# Patient Record
Sex: FEMALE | Race: WHITE | NOT HISPANIC OR LATINO | Employment: FULL TIME | URBAN - METROPOLITAN AREA
[De-identification: names, ages, dates, MRNs, and addresses within clinical notes are randomized per-mention and may not be internally consistent; named-entity substitution may affect disease eponyms.]

---

## 2017-08-11 ENCOUNTER — TRANSCRIBE ORDERS (OUTPATIENT)
Dept: LAB | Facility: HOSPITAL | Age: 47
End: 2017-08-11

## 2017-08-11 ENCOUNTER — APPOINTMENT (OUTPATIENT)
Dept: LAB | Facility: HOSPITAL | Age: 47
End: 2017-08-11

## 2017-08-11 DIAGNOSIS — N95.1 MENOPAUSAL SYMPTOMS: Primary | ICD-10-CM

## 2017-08-11 LAB — FSH SERPL-ACNC: 10 MIU/ML

## 2017-08-11 PROCEDURE — 83001 ASSAY OF GONADOTROPIN (FSH): CPT | Performed by: OBSTETRICS & GYNECOLOGY

## 2017-08-11 PROCEDURE — 36415 COLL VENOUS BLD VENIPUNCTURE: CPT | Performed by: OBSTETRICS & GYNECOLOGY

## 2017-11-22 ENCOUNTER — OFFICE VISIT (OUTPATIENT)
Dept: GYNECOLOGIC ONCOLOGY | Facility: CLINIC | Age: 47
End: 2017-11-22

## 2017-11-22 VITALS
BODY MASS INDEX: 28 KG/M2 | SYSTOLIC BLOOD PRESSURE: 132 MMHG | WEIGHT: 158 LBS | OXYGEN SATURATION: 97 % | HEART RATE: 86 BPM | RESPIRATION RATE: 12 BRPM | DIASTOLIC BLOOD PRESSURE: 74 MMHG | HEIGHT: 63 IN | TEMPERATURE: 98.3 F

## 2017-11-22 DIAGNOSIS — Z98.890 S/P LEEP: ICD-10-CM

## 2017-11-22 DIAGNOSIS — N87.1 DYSPLASIA OF CERVIX, HIGH GRADE CIN 2: Primary | ICD-10-CM

## 2017-11-22 DIAGNOSIS — D06.9 ADENOCARCINOMA IN SITU (AIS) OF UTERINE CERVIX: ICD-10-CM

## 2017-11-22 PROCEDURE — 99244 OFF/OP CNSLTJ NEW/EST MOD 40: CPT | Performed by: OBSTETRICS & GYNECOLOGY

## 2017-11-22 NOTE — PROGRESS NOTES
Simi Royal  [id number]  1970      Reason for visit:  Adenocarcinoma in situ, recent LEEP    Consultation:  Patient is being seen at the request of Dr. Rg     History of present illness:  The patient is a 47 y.o. 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 Patient is s/p LEEP on 17 that showed mild to moderate squamous dysplasia with negative margins but also multiple foci of adenocarcinoma in situ with positive margins. Patient reports no pain from procedure but notes intermittent black discharge since the procedure. Denies vaginal bleeding since endometrial ablation on 2014. Denies weight change, change in appetite, change in girth, pain with defecation or urination.    OBGYN History:  She is a .  She is s/p endometrial ablation  and has not had menstrual bleeding since this time. She has not yet undergone menopause. She does has a history of abnormal pap smears.      Oncologic History:   No history exists.         History reviewed. No pertinent past medical history.    Past Surgical History:   Procedure Laterality Date   • APPENDECTOMY     • CHOLECYSTECTOMY     • ENDOMETRIAL ABLATION         MEDICATIONS: The current medication list was reviewed with the patient and updated in the EMR this date per the Medical Assistant. Medication dosages and frequencies were confirmed to be accurate.      Allergies:  has No Known Allergies.    Social History:   Social History     Social History   • Marital status:      Spouse name: N/A   • Number of children: 3   • Years of education: N/A     Occupational History   • Not on file.     Social History Main Topics   • Smoking status: Never Smoker   • Smokeless tobacco: Not on file   • Alcohol use Yes   • Drug use: No   • Sexual activity: No     Other Topics Concern   • Not on file     Social History Narrative   • No narrative on file       Family History:  No family  "history on file.    Health Maintenance:    Health Maintenance   Topic Date Due   • TDAP/TD VACCINES (1 - Tdap) 01/19/1989   • INFLUENZA VACCINE  08/01/2017   • PAP SMEAR  11/22/2017       Review of Systems   Constitutional: Negative for activity change, appetite change, chills, fatigue, fever and unexpected weight change.   HENT: Negative for dental problem, mouth sores, nosebleeds, postnasal drip, sore throat and trouble swallowing.    Eyes: Negative for pain and visual disturbance.   Respiratory: Negative for cough, chest tightness, shortness of breath and wheezing.    Cardiovascular: Negative for chest pain and leg swelling.   Gastrointestinal: Negative for abdominal pain, blood in stool, constipation, diarrhea, nausea and vomiting.   Endocrine: Negative for cold intolerance, heat intolerance and polyuria.   Genitourinary: Positive for vaginal discharge (intermittent black-colored discharge since LEEP). Negative for dyspareunia, dysuria, enuresis, frequency, hematuria, pelvic pain, urgency and vaginal bleeding.   Musculoskeletal: Negative for arthralgias and gait problem.   Skin: Negative for rash and wound.   Allergic/Immunologic: Negative for environmental allergies, food allergies and immunocompromised state.   Neurological: Negative for dizziness, seizures, weakness, numbness and headaches.   Hematological: Negative for adenopathy. Does not bruise/bleed easily.   Psychiatric/Behavioral: Positive for dysphoric mood. Negative for confusion and sleep disturbance. The patient is nervous/anxious.        Physical Exam    Vitals:    11/22/17 1348   BP: 132/74   Pulse: 86   Resp: 12   Temp: 98.3 °F (36.8 °C)   TempSrc: Temporal Artery    SpO2: 97%   Weight: 158 lb (71.7 kg)   Height: 62.5\" (158.8 cm)     Body mass index is 28.44 kg/(m^2).      GENERAL: Alert, well-appearing female appearing her stated age who is in no apparent distress.   HEENT: Sclera anicteric. Head normocephalic, atraumatic. Mucus membranes moist. "   NECK: Trachea midline, supple, without masses.  No thyromegaly.   BREASTS: Deferred  CARDIOVASCULAR: Normal rate, regular rhythm, no murmurs, rubs, or gallops.  No peripheral edema.  RESPIRATORY: Clear to auscultation bilaterally, normal respiratory effort  BACK:  No CVA tenderness, no vertebral tenderness on palpation  GASTROINTESTINAL:  Abdomen is soft, non-tender, non-distended, no rebound or guarding, no masses, or hernias. No HSM.   SKIN:  Warm, dry, well-perfused.  All visible areas intact.  No rashes, lesions, ulcers.  PSYCHIATRIC: AO x3, with appropriate affect, normal thought processes.  NEUROLOGIC: No focal deficits.  Moves extremities well.  MUSCULOSKELETAL: Normal gait and station.   EXTREMITIES:   No cyanosis, clubbing, symmetric.  LYMPHATICS:  No cervical or inguinal adenopathy noted.     PELVIC exam:    GYNECOLOGIC:  External genitalia are free from lesion. On speculum examination, the cervix appears to be healing well, showing typical postoperative changes, no bleeding. On bimanual examination no mass was appreciated.  Uterus was normal in size and shape. There is no cervical motion or uterine tenderness. No cervical mass was palpated. Parametria were smooth. Rectovaginal exam was deferred.     ECOG PS 0    PROCEDURES:  none    Diagnostic Data:      LEEP pathology 11/13/17:  Residual foci of mild and moderate squamous dysplasia with HV changes (LSIL and HSIL).  The endo and ectocervical margins of excision are free of both squamous lesions.  There are separate foci of endocervical adenocarcinoma in situ.  No invasive neoplasia is identified.  The ectocervical margins of excision of excision is free of the glandular lesion; however, the glandular lesion extends to involve the endocervical margin of excision focally.    HPV 18: positive        Assessment/Plan   This is a 47 y.o. woman who presents today for evaluation and treatment planning for adenocarcinoma on situ on recent LEEP  pathology.    Adenocarcinoma in situ  - multiple foci with positive margins on LEEP  - discussed necessity to proceed towards cold knife cone prior to hysterectomy planning to assess extent of adenocarcinoma in situ and to exclude invasive disease    Patient was consented for cold knife cone in the outpatient surgery center.      Risks and benefits of surgery were discussed.  This included, but was not limited to, infection and bleeding like when the skin is cut; damage to surrounding structures; and incisional complications.  Typical hospital stay and recovery were discussed as well as post-procedure precautions.  Surgical implications of chronic illnesses on recovery and surgical outcome were reviewed.     Patient verbalized understanding of the plan including the risks and benefits.  Appropriate perioperative testing including laboratory evaluation, EKG as clinically indicated, chest x-ray as clinically indicated, and preadmission evaluation were all ordered as a part of this patient's care.    FOLLOW UP: Follow up for surgery    Note: Speech recognition transcription software was used to dictate portions of this document.  An attempt at proofreading has been made though minor errors in transcription may still be present.  Please do not hesitate to call our office with any questions.    Patient was seen and examined with Dr. Whittington,  resident, who performed portions of the examination and documentation for this patient's care under my direct supervision.    Kellen David MD  11/22/17  5:06 PM

## 2017-11-27 ENCOUNTER — PREP FOR SURGERY (OUTPATIENT)
Dept: GYNECOLOGIC ONCOLOGY | Facility: CLINIC | Age: 47
End: 2017-11-27

## 2017-11-27 DIAGNOSIS — D06.9 ADENOCARCINOMA IN SITU (AIS) OF UTERINE CERVIX: Primary | ICD-10-CM

## 2017-11-28 ENCOUNTER — PREP FOR SURGERY (OUTPATIENT)
Dept: GYNECOLOGIC ONCOLOGY | Facility: CLINIC | Age: 47
End: 2017-11-28

## 2017-11-28 NOTE — PATIENT INSTRUCTIONS
Outpatient Pre-op Patient Education  *See checked boxes for your instructions*    Simi Royal  [id number]  1970    SURGEON: Dr. David    Appointment  [x]  1. Your surgery has been scheduled on 12-18-17 at [facility] located at [address]. You will need to be there at 8:45 AM.   [x] 2.  You will need to have your blood work done on 12-13-17 at [facility] in [place].  You may go anytime that day, and do not need to be fasting.    [] 3.  The registration department is located in the long hallway between the [address].       The Day(s) Before Surgery  [x] 1.  Do not drink alcohol or smoke.     [x] 2.  Do not take vitamins or aspirin one week before surgery.       [x] 3.  If you are ill on the days leading up to your surgery, please call our office.      [x] 4.  If you are using medications for diabetes, call the physician who manages these and get instructions on how they should be taken before and after surgery.    [x] 5.  Nothing to eat or drink after midnight on 12-17-17.      [x] 6.  Please make prior arrangements for someone to drive you home after your procedure        The Day of Surgery  [x] 1.  Do not eat, drink, or chew gum.     [x] 2.  On the morning of your surgery, you may take her prescription medications with a sip of water. Bring all medication with you to the surgery center. (Diabetic patients should bring insulin if instructed to do so by their diabetes managing physician).   [x] 3. Bathe or shower the morning of your surgery. Do not use powders, lotions, or creams. Deodorants are okay.     [x] 4. Wear loose, comfortable clothing.     [x] 5. Bring holders for glasses, contacts, or dentures.      [x] 6. Bring any required payment and forms, including insurance cards. Leave all money and valuables at home.        Post-surgery Instructions  [x] 1.  For the first 24 hours, rest and take periodic deep breaths to remove anesthetic agents from  your body.   [x] 2.  Follow any specific instructions relevant to your particular surgery.     [x] 3.  Limit activity to avoid stress to the surgical site.      [x] 4.  Keep all dressings dry. Shower or bathe as instructed by your doctor.     [x] 5.  Drink and eat light foods. Remain on liquids alone only if nausea and vomiting occur. Return to your regular diet gradually, as tolerance allows.    [x] 6. Avoid alcohol for at least 24 hours.      [x] 7.  Take prescription pain medication as directed and with food.      [x] 8.  Call our office after discharge from the surgery center to make your post-op appointment.        In Case of Emergency:  Call our office if you experience any of the following:  - Excessive drainage, bleeding, swelling, or redness at the incision site  - Severe pain not eased by pain medication  - Temperature above 101  - Persistent nausea or vomiting  - Skin rash or general body itching

## 2017-12-15 ENCOUNTER — LAB (OUTPATIENT)
Dept: LAB | Facility: HOSPITAL | Age: 47
End: 2017-12-15

## 2017-12-15 DIAGNOSIS — N87.1 DYSPLASIA OF CERVIX, HIGH GRADE CIN 2: ICD-10-CM

## 2017-12-15 DIAGNOSIS — D06.9 ADENOCARCINOMA IN SITU (AIS) OF UTERINE CERVIX: ICD-10-CM

## 2017-12-15 DIAGNOSIS — Z98.890 S/P LEEP: Primary | ICD-10-CM

## 2017-12-15 DIAGNOSIS — Z98.890 S/P LEEP: ICD-10-CM

## 2017-12-15 LAB
ALBUMIN SERPL-MCNC: 4.5 G/DL (ref 3.5–5)
ALBUMIN/GLOB SERPL: 1.7 G/DL (ref 1.5–2.5)
ALP SERPL-CCNC: 62 U/L (ref 35–104)
ALT SERPL W P-5'-P-CCNC: 22 U/L (ref 10–36)
ANION GAP SERPL CALCULATED.3IONS-SCNC: 6 MMOL/L (ref 3.6–11.2)
AST SERPL-CCNC: 20 U/L (ref 10–30)
BASOPHILS # BLD AUTO: 0.12 10*3/MM3 (ref 0–0.3)
BASOPHILS NFR BLD AUTO: 2.8 % (ref 0–2)
BILIRUB SERPL-MCNC: 0.7 MG/DL (ref 0.2–1.8)
BUN BLD-MCNC: 10 MG/DL (ref 7–21)
BUN/CREAT SERPL: 10.9 (ref 7–25)
CALCIUM SPEC-SCNC: 9.6 MG/DL (ref 7.7–10)
CHLORIDE SERPL-SCNC: 107 MMOL/L (ref 99–112)
CO2 SERPL-SCNC: 27 MMOL/L (ref 24.3–31.9)
CREAT BLD-MCNC: 0.92 MG/DL (ref 0.43–1.29)
DEPRECATED RDW RBC AUTO: 40.5 FL (ref 37–54)
EOSINOPHIL # BLD AUTO: 0.29 10*3/MM3 (ref 0–0.7)
EOSINOPHIL NFR BLD AUTO: 6.9 % (ref 0–5)
ERYTHROCYTE [DISTWIDTH] IN BLOOD BY AUTOMATED COUNT: 12.3 % (ref 11.5–14.5)
GFR SERPL CREATININE-BSD FRML MDRD: 65 ML/MIN/1.73
GLOBULIN UR ELPH-MCNC: 2.6 GM/DL
GLUCOSE BLD-MCNC: 92 MG/DL (ref 70–110)
HCG INTACT+B SERPL-ACNC: <2 MIU/ML (ref 0–5)
HCT VFR BLD AUTO: 40.8 % (ref 37–47)
HGB BLD-MCNC: 13.6 G/DL (ref 12–16)
IMM GRANULOCYTES # BLD: 0.01 10*3/MM3 (ref 0–0.03)
IMM GRANULOCYTES NFR BLD: 0.2 % (ref 0–0.5)
LYMPHOCYTES # BLD AUTO: 1.74 10*3/MM3 (ref 1–3)
LYMPHOCYTES NFR BLD AUTO: 41.2 % (ref 21–51)
MCH RBC QN AUTO: 30.8 PG (ref 27–33)
MCHC RBC AUTO-ENTMCNC: 33.3 G/DL (ref 33–37)
MCV RBC AUTO: 92.5 FL (ref 80–94)
MONOCYTES # BLD AUTO: 0.42 10*3/MM3 (ref 0.1–0.9)
MONOCYTES NFR BLD AUTO: 10 % (ref 0–10)
NEUTROPHILS # BLD AUTO: 1.64 10*3/MM3 (ref 1.4–6.5)
NEUTROPHILS NFR BLD AUTO: 38.9 % (ref 30–70)
OSMOLALITY SERPL CALC.SUM OF ELEC: 278.1 MOSM/KG (ref 273–305)
PLATELET # BLD AUTO: 271 10*3/MM3 (ref 130–400)
PMV BLD AUTO: 9.6 FL (ref 6–10)
POTASSIUM BLD-SCNC: 3.8 MMOL/L (ref 3.5–5.3)
PROT SERPL-MCNC: 7.1 G/DL (ref 6–8)
RBC # BLD AUTO: 4.41 10*6/MM3 (ref 4.2–5.4)
SODIUM BLD-SCNC: 140 MMOL/L (ref 135–153)
WBC NRBC COR # BLD: 4.22 10*3/MM3 (ref 4.5–12.5)

## 2017-12-15 PROCEDURE — 84702 CHORIONIC GONADOTROPIN TEST: CPT

## 2017-12-15 PROCEDURE — 36415 COLL VENOUS BLD VENIPUNCTURE: CPT

## 2017-12-15 PROCEDURE — 85025 COMPLETE CBC W/AUTO DIFF WBC: CPT

## 2017-12-15 PROCEDURE — 80053 COMPREHEN METABOLIC PANEL: CPT

## 2017-12-18 ENCOUNTER — OUTSIDE FACILITY SERVICE (OUTPATIENT)
Dept: GYNECOLOGIC ONCOLOGY | Facility: CLINIC | Age: 47
End: 2017-12-18

## 2017-12-18 ENCOUNTER — LAB REQUISITION (OUTPATIENT)
Dept: LAB | Facility: HOSPITAL | Age: 47
End: 2017-12-18

## 2017-12-18 DIAGNOSIS — D06.9 CARCINOMA IN SITU OF CERVIX: ICD-10-CM

## 2017-12-18 PROCEDURE — 88307 TISSUE EXAM BY PATHOLOGIST: CPT | Performed by: OBSTETRICS & GYNECOLOGY

## 2017-12-18 PROCEDURE — 57520 CONIZATION OF CERVIX: CPT | Performed by: OBSTETRICS & GYNECOLOGY

## 2017-12-20 LAB
CYTO UR: NORMAL
LAB AP CASE REPORT: NORMAL
LAB AP CLINICAL INFORMATION: NORMAL
LAB AP DIAGNOSIS COMMENT: NORMAL
Lab: NORMAL
PATH REPORT.FINAL DX SPEC: NORMAL
PATH REPORT.GROSS SPEC: NORMAL

## 2017-12-22 ENCOUNTER — TELEPHONE (OUTPATIENT)
Dept: GYNECOLOGIC ONCOLOGY | Facility: CLINIC | Age: 47
End: 2017-12-22

## 2017-12-22 ENCOUNTER — PREP FOR SURGERY (OUTPATIENT)
Dept: GYNECOLOGIC ONCOLOGY | Facility: CLINIC | Age: 47
End: 2017-12-22

## 2017-12-22 DIAGNOSIS — D06.9 ADENOCARCINOMA IN SITU OF CERVIX: Primary | ICD-10-CM

## 2017-12-22 NOTE — TELEPHONE ENCOUNTER
MD Elana Rose, CHRISTIE                     Please notify patient of pathology:  No further AIS noted.   Patient can be scheduled for LAVH +/- BSO 4-6 weeks after conization.  I will see in office in 4 weeks for post op check.  Thanks      Phoned pt per Dr. David's v/o, informed her of results, date for surgery set for 1-17-18, informed pt of surgery to be done, states wants everything removed.  Told her to let Dr. David aware at time of post op appt.  Pt v/u, will do as instructed, transferred to reception to schedule.

## 2017-12-27 RX ORDER — PREGABALIN 25 MG/1
150 CAPSULE ORAL ONCE
Status: CANCELLED | OUTPATIENT
Start: 2017-12-27 | End: 2017-12-27

## 2017-12-27 RX ORDER — SODIUM CHLORIDE, SODIUM LACTATE, POTASSIUM CHLORIDE, CALCIUM CHLORIDE 600; 310; 30; 20 MG/100ML; MG/100ML; MG/100ML; MG/100ML
100 INJECTION, SOLUTION INTRAVENOUS CONTINUOUS
Status: CANCELLED | OUTPATIENT
Start: 2017-12-27

## 2017-12-27 RX ORDER — CELECOXIB 100 MG/1
200 CAPSULE ORAL ONCE
Status: CANCELLED | OUTPATIENT
Start: 2017-12-27 | End: 2017-12-27

## 2017-12-27 RX ORDER — ACETAMINOPHEN 325 MG/1
650 TABLET ORAL ONCE
Status: CANCELLED | OUTPATIENT
Start: 2017-12-27 | End: 2017-12-27

## 2017-12-28 ENCOUNTER — PATIENT EDUCATION (SURGERY INSTRUCTIONS) (OUTPATIENT)
Dept: GYNECOLOGIC ONCOLOGY | Facility: CLINIC | Age: 47
End: 2017-12-28

## 2017-12-28 PROBLEM — D06.9 ADENOCARCINOMA IN SITU OF CERVIX: Status: ACTIVE | Noted: 2017-12-28

## 2017-12-28 NOTE — PATIENT INSTRUCTIONS
Gynecologic Oncology  Inpatient Pre-op Patient Education  *See checked boxes for your instructions*    Patient Name:  Simi Royal  [id number]  1970    Surgeon:  Dr. David    Appointment  [x]  1. Your surgery has been scheduled on 1-17-18. You will need to be at the second floor surgery registration of the [facility] hospital on that day at 6:00 AM.   [x] 2.  You have a pre-admission testing (PAT) appointment for labs and possibly chest xray and EKG, on 1-16-18 at 2:00 PM.  You will need to be at hospital registration on the first floor, 10 minutes before that time.   [x] 3.  The hospital registration department is located in the long hallway between the [address] and [address] buildings.     The Day(s) Before Surgery  [x] 1. On 1-16-18, the day prior to surgery, eat lightly.  No solid food after midnight on 1-16-18, including NO MILK, CREAM, OR ORANGE JUICE.  You may have sips of clear fluids up until two-three hours prior to your arrival to the hospital on the morning of surgery.     [] 2.  You may need to use a stool softener, the day prior to surgery to help with existing constipation and to clean out your bowels.  You can purchase Miralax over-the-counter at the pharmacy and follow the directions on the back.  (Do not do this step unless the box is checked).   [x] 3.  Do not take vitamins or full dose aspirin one week before surgery.  If you normally take a blood thinning medication such as Warfarin, Eliquis, or Xarelto, we will give you specific instructions regarding these medications and we may need to talk with your other doctors.   [x] 4.  On the morning of your surgery, you may likely take your routine prescription medications with a sip of water as reviewed with you by your surgeon.  Bring your home medications with you to the hospital as we may need to reference these.  In particular be sure to bring any inhalers.     Post-surgery Instructions  [x] 1.  The length of stay for your type of surgery is  typically one hospital night, however it is also possible that you could be discharged home in the evening on the same day depending on the nature of your surgery.  All rooms are private, so family member may stay with you.     [x] 2.  Do not take your own home prescription medication while you are in the hospital unless otherwise instructed.  These will be provided to you.         Comments:

## 2018-01-11 ENCOUNTER — OFFICE VISIT (OUTPATIENT)
Dept: GYNECOLOGIC ONCOLOGY | Facility: CLINIC | Age: 48
End: 2018-01-11

## 2018-01-11 VITALS
HEART RATE: 74 BPM | SYSTOLIC BLOOD PRESSURE: 126 MMHG | TEMPERATURE: 98 F | WEIGHT: 163 LBS | OXYGEN SATURATION: 98 % | BODY MASS INDEX: 29.34 KG/M2 | DIASTOLIC BLOOD PRESSURE: 73 MMHG | RESPIRATION RATE: 14 BRPM

## 2018-01-11 DIAGNOSIS — D06.9 ADENOCARCINOMA IN SITU (AIS) OF UTERINE CERVIX: Primary | ICD-10-CM

## 2018-01-11 DIAGNOSIS — Z98.890 POST-OPERATIVE STATE: ICD-10-CM

## 2018-01-11 PROCEDURE — 99024 POSTOP FOLLOW-UP VISIT: CPT | Performed by: OBSTETRICS & GYNECOLOGY

## 2018-01-11 NOTE — PROGRESS NOTES
Simi Royal  [id number]  1970      Reason for Visit:  Postoperative evaluation    History of Present Illness:  Patient is a very pleasant 47 y.o. woman who presents for a post operative evaluation status post Cold Knife Cone performed on 12/18/17 for AIS diagnosed on LEEP tonya .  Surgery was uncomplicated.  Today, patient notes normal bowel and bladder function.  Her pain is well controlled. She has questions about resuming normal activities.     Past Medical History, Past Surgical History, Social History, Family History have been reviewed and are without significant changes except as mentioned.    Review of Systems   A comprehensive 12 point review of systems was performed and was negative except as mentioned.    Medications:  The current medication list was reviewed in the EMR    ALLERGIES:  No Known Allergies        /73  Pulse 74  Temp 98 °F (36.7 °C) (Temporal Artery )   Resp 14  Wt 73.9 kg (163 lb)  SpO2 98%  BMI 29.34 kg/m2       Physical Exam  Constitutional:  Patient is a pleasant woman in no acute distress.  Gastrointestinal: Abdomen is soft and non tender.  There is no mass palpated.  There is no rebound or guarding.   Extremities:  Bilateral lower extremities are non-tender.  Gynecologic:GYNECOLOGIC:  External genitalia are free from lesion. On speculum examination, the cervix was healing well from CKC. On bimanual examination no mass was appreciated.  Uterus was normal in size and shape. There is no cervical motion or uterine tenderness. No cervical mass was palpated. Parametria were smooth. Rectovaginal exam was deferred.       Pathology:    Final Diagnosis   1. CERVICAL CONE, EXCISION:   Ectocervical tissue with focal acute and chronic cervicitis and reactive changes.   Inflammation and reactive changes compatible with prior surgical procedure with limited transformation zone present with reactive appearing endocervical tissue present.  Changes consistent with prior  "surgical procedure extend to the endocervical margins of this specimen (See comment)  2. CERVICAL \"ADDITIONAL CERVICAL CONE\":  Benign endocervical tissue.   Acute and chronic inflammation.   No dysplasia identified. (See comment)         ASSESSMENT/PLAN:  Simi Royal returns for a post-operative evaluation today s/p Cold Knife Cone for AIS.  All pathology reports were given to patient.      Cone margins negative for malignancy. Cervix healing well post procedure. Patient already scheduled for LAVH/BSO on 1/17/18.    Risks and benefits of surgery were discussed.  This included, but was not limited to, infection and bleeding like when the skin is cut; damage to surrounding structures; and incisional complications.  Risk of DVT was addressed for major surgeries.  Standard of care efforts to minimize these risks were reviewed.  Typical hospital stay and recovery were discussed as well as post-procedure precautions.  Surgical implications of chronic illnesses on recovery and surgical outcome were reviewed.     Discussed risks of removal of ovaries including surgical menopause, cardiovascular and bone effects. Also discussed risks with retention of ovaries including risk of needing additional surgery, overall lifetime risks of ovarian cancer. Discussed average age of menopause being 51. Patient desires bilateral salpingectomy at time of hysterectomy.     Patient verbalized understanding of the plan including the risks and benefits.  Appropriate perioperative testing including laboratory evaluation, EKG as clinically indicated, chest x-ray as clinically indicated, and preadmission evaluation were all ordered as a part of this patient's care.     Overall, the patient is very pleased with her care.  I recommended continuation of post operative precautions as discussed.     She is to present for scheduled Laparoscopic Assisted Vaginal Hysterectomy, bilateral salpingectomy on 1/17/18.     Chyna Braden, " MD      Patient was seen and examined with Dr. Braden,  resident, who performed portions of the examination and documentation for this patient's care under my direct supervision.    Kellen David MD  01/13/18  10:04 AM

## 2018-01-16 ENCOUNTER — PREP FOR SURGERY (OUTPATIENT)
Dept: GYNECOLOGIC ONCOLOGY | Facility: CLINIC | Age: 48
End: 2018-01-16

## 2018-01-16 ENCOUNTER — APPOINTMENT (OUTPATIENT)
Dept: PREADMISSION TESTING | Facility: HOSPITAL | Age: 48
End: 2018-01-16

## 2018-01-16 VITALS — WEIGHT: 163 LBS | BODY MASS INDEX: 28.88 KG/M2 | HEIGHT: 63 IN

## 2018-01-16 DIAGNOSIS — D06.9 ADENOCARCINOMA IN SITU (AIS) OF UTERINE CERVIX: Primary | ICD-10-CM

## 2018-01-16 DIAGNOSIS — D06.9 ADENOCARCINOMA IN SITU OF CERVIX: ICD-10-CM

## 2018-01-16 LAB
ABO GROUP BLD: NORMAL
ALBUMIN SERPL-MCNC: 4.4 G/DL (ref 3.2–4.8)
ALBUMIN/GLOB SERPL: 1.6 G/DL (ref 1.5–2.5)
ALP SERPL-CCNC: 65 U/L (ref 25–100)
ALT SERPL W P-5'-P-CCNC: 23 U/L (ref 7–40)
ANION GAP SERPL CALCULATED.3IONS-SCNC: 8 MMOL/L (ref 3–11)
AST SERPL-CCNC: 18 U/L (ref 0–33)
BASOPHILS # BLD AUTO: 0.07 10*3/MM3 (ref 0–0.2)
BASOPHILS NFR BLD AUTO: 1.3 % (ref 0–1)
BILIRUB SERPL-MCNC: 0.6 MG/DL (ref 0.3–1.2)
BLD GP AB SCN SERPL QL: NEGATIVE
BUN BLD-MCNC: 9 MG/DL (ref 9–23)
BUN/CREAT SERPL: 11.3 (ref 7–25)
CALCIUM SPEC-SCNC: 9.5 MG/DL (ref 8.7–10.4)
CHLORIDE SERPL-SCNC: 103 MMOL/L (ref 99–109)
CO2 SERPL-SCNC: 28 MMOL/L (ref 20–31)
CREAT BLD-MCNC: 0.8 MG/DL (ref 0.6–1.3)
DEPRECATED RDW RBC AUTO: 41.8 FL (ref 37–54)
EOSINOPHIL # BLD AUTO: 0.42 10*3/MM3 (ref 0–0.3)
EOSINOPHIL NFR BLD AUTO: 8 % (ref 0–3)
ERYTHROCYTE [DISTWIDTH] IN BLOOD BY AUTOMATED COUNT: 12.3 % (ref 11.3–14.5)
GFR SERPL CREATININE-BSD FRML MDRD: 77 ML/MIN/1.73
GLOBULIN UR ELPH-MCNC: 2.8 GM/DL
GLUCOSE BLD-MCNC: 82 MG/DL (ref 70–100)
HCG INTACT+B SERPL-ACNC: <5 MIU/ML
HCT VFR BLD AUTO: 42.1 % (ref 34.5–44)
HGB BLD-MCNC: 14.1 G/DL (ref 11.5–15.5)
IMM GRANULOCYTES # BLD: 0.01 10*3/MM3 (ref 0–0.03)
IMM GRANULOCYTES NFR BLD: 0.2 % (ref 0–0.6)
LYMPHOCYTES # BLD AUTO: 2.25 10*3/MM3 (ref 0.6–4.8)
LYMPHOCYTES NFR BLD AUTO: 42.9 % (ref 24–44)
MCH RBC QN AUTO: 30.9 PG (ref 27–31)
MCHC RBC AUTO-ENTMCNC: 33.5 G/DL (ref 32–36)
MCV RBC AUTO: 92.3 FL (ref 80–99)
MONOCYTES # BLD AUTO: 0.38 10*3/MM3 (ref 0–1)
MONOCYTES NFR BLD AUTO: 7.2 % (ref 0–12)
NEUTROPHILS # BLD AUTO: 2.12 10*3/MM3 (ref 1.5–8.3)
NEUTROPHILS NFR BLD AUTO: 40.4 % (ref 41–71)
PLATELET # BLD AUTO: 244 10*3/MM3 (ref 150–450)
PMV BLD AUTO: 9 FL (ref 6–12)
POTASSIUM BLD-SCNC: 3.5 MMOL/L (ref 3.5–5.5)
PROT SERPL-MCNC: 7.2 G/DL (ref 5.7–8.2)
RBC # BLD AUTO: 4.56 10*6/MM3 (ref 3.89–5.14)
RH BLD: POSITIVE
SODIUM BLD-SCNC: 139 MMOL/L (ref 132–146)
WBC NRBC COR # BLD: 5.25 10*3/MM3 (ref 3.5–10.8)

## 2018-01-16 PROCEDURE — 80053 COMPREHEN METABOLIC PANEL: CPT | Performed by: OBSTETRICS & GYNECOLOGY

## 2018-01-16 PROCEDURE — 85025 COMPLETE CBC W/AUTO DIFF WBC: CPT | Performed by: OBSTETRICS & GYNECOLOGY

## 2018-01-16 PROCEDURE — 84702 CHORIONIC GONADOTROPIN TEST: CPT | Performed by: OBSTETRICS & GYNECOLOGY

## 2018-01-16 PROCEDURE — 36415 COLL VENOUS BLD VENIPUNCTURE: CPT

## 2018-01-16 PROCEDURE — 86901 BLOOD TYPING SEROLOGIC RH(D): CPT | Performed by: OBSTETRICS & GYNECOLOGY

## 2018-01-16 PROCEDURE — 86850 RBC ANTIBODY SCREEN: CPT | Performed by: OBSTETRICS & GYNECOLOGY

## 2018-01-16 PROCEDURE — 86900 BLOOD TYPING SEROLOGIC ABO: CPT | Performed by: OBSTETRICS & GYNECOLOGY

## 2018-01-17 ENCOUNTER — ANESTHESIA (OUTPATIENT)
Dept: PERIOP | Facility: HOSPITAL | Age: 48
End: 2018-01-17

## 2018-01-17 ENCOUNTER — ANESTHESIA EVENT (OUTPATIENT)
Dept: PERIOP | Facility: HOSPITAL | Age: 48
End: 2018-01-17

## 2018-01-17 ENCOUNTER — HOSPITAL ENCOUNTER (OUTPATIENT)
Facility: HOSPITAL | Age: 48
Discharge: HOME | End: 2018-01-17
Attending: OBSTETRICS & GYNECOLOGY | Admitting: OBSTETRICS & GYNECOLOGY

## 2018-01-17 VITALS
OXYGEN SATURATION: 98 % | HEART RATE: 93 BPM | TEMPERATURE: 97.9 F | SYSTOLIC BLOOD PRESSURE: 117 MMHG | RESPIRATION RATE: 18 BRPM | DIASTOLIC BLOOD PRESSURE: 72 MMHG

## 2018-01-17 DIAGNOSIS — D06.9 ADENOCARCINOMA IN SITU OF CERVIX: ICD-10-CM

## 2018-01-17 PROCEDURE — 25010000002 ONDANSETRON PER 1 MG: Performed by: NURSE ANESTHETIST, CERTIFIED REGISTERED

## 2018-01-17 PROCEDURE — 25010000002 PROMETHAZINE PER 50 MG: Performed by: ANESTHESIOLOGY

## 2018-01-17 PROCEDURE — G0378 HOSPITAL OBSERVATION PER HR: HCPCS

## 2018-01-17 PROCEDURE — 25010000002 PROPOFOL 10 MG/ML EMULSION: Performed by: NURSE ANESTHETIST, CERTIFIED REGISTERED

## 2018-01-17 PROCEDURE — 25010000002 FENTANYL CITRATE (PF) 100 MCG/2ML SOLUTION: Performed by: NURSE ANESTHETIST, CERTIFIED REGISTERED

## 2018-01-17 PROCEDURE — 88309 TISSUE EXAM BY PATHOLOGIST: CPT | Performed by: OBSTETRICS & GYNECOLOGY

## 2018-01-17 PROCEDURE — 25010000002 DEXAMETHASONE PER 1 MG: Performed by: NURSE ANESTHETIST, CERTIFIED REGISTERED

## 2018-01-17 PROCEDURE — 58552 LAPARO-VAG HYST INCL T/O: CPT | Performed by: OBSTETRICS & GYNECOLOGY

## 2018-01-17 PROCEDURE — 25010000002 CEFOXITIN PER 1 G: Performed by: OBSTETRICS & GYNECOLOGY

## 2018-01-17 PROCEDURE — 25010000002 NEOSTIGMINE 10 MG/10ML SOLUTION: Performed by: NURSE ANESTHETIST, CERTIFIED REGISTERED

## 2018-01-17 RX ORDER — BUPIVACAINE HYDROCHLORIDE AND EPINEPHRINE 5; 5 MG/ML; UG/ML
INJECTION, SOLUTION PERINEURAL AS NEEDED
Status: DISCONTINUED | OUTPATIENT
Start: 2018-01-17 | End: 2018-01-17 | Stop reason: HOSPADM

## 2018-01-17 RX ORDER — MAGNESIUM HYDROXIDE 1200 MG/15ML
LIQUID ORAL AS NEEDED
Status: DISCONTINUED | OUTPATIENT
Start: 2018-01-17 | End: 2018-01-17 | Stop reason: HOSPADM

## 2018-01-17 RX ORDER — FAMOTIDINE 10 MG/ML
20 INJECTION, SOLUTION INTRAVENOUS ONCE
Status: CANCELLED | OUTPATIENT
Start: 2018-01-17 | End: 2018-01-17

## 2018-01-17 RX ORDER — OXYCODONE HYDROCHLORIDE 5 MG/1
10 TABLET ORAL EVERY 4 HOURS PRN
Status: DISCONTINUED | OUTPATIENT
Start: 2018-01-17 | End: 2018-01-17 | Stop reason: HOSPADM

## 2018-01-17 RX ORDER — HYDROMORPHONE HYDROCHLORIDE 1 MG/ML
0.5 INJECTION, SOLUTION INTRAMUSCULAR; INTRAVENOUS; SUBCUTANEOUS
Status: DISCONTINUED | OUTPATIENT
Start: 2018-01-17 | End: 2018-01-17 | Stop reason: HOSPADM

## 2018-01-17 RX ORDER — SODIUM CHLORIDE, SODIUM LACTATE, POTASSIUM CHLORIDE, CALCIUM CHLORIDE 600; 310; 30; 20 MG/100ML; MG/100ML; MG/100ML; MG/100ML
100 INJECTION, SOLUTION INTRAVENOUS CONTINUOUS
Status: DISCONTINUED | OUTPATIENT
Start: 2018-01-17 | End: 2018-01-17 | Stop reason: HOSPADM

## 2018-01-17 RX ORDER — LABETALOL HYDROCHLORIDE 5 MG/ML
5 INJECTION, SOLUTION INTRAVENOUS
Status: DISCONTINUED | OUTPATIENT
Start: 2018-01-17 | End: 2018-01-17 | Stop reason: HOSPADM

## 2018-01-17 RX ORDER — FENTANYL CITRATE 50 UG/ML
INJECTION, SOLUTION INTRAMUSCULAR; INTRAVENOUS AS NEEDED
Status: DISCONTINUED | OUTPATIENT
Start: 2018-01-17 | End: 2018-01-17 | Stop reason: SURG

## 2018-01-17 RX ORDER — ACETAMINOPHEN 325 MG/1
650 TABLET ORAL EVERY 6 HOURS PRN
Qty: 60 TABLET | Refills: 0 | Status: SHIPPED | OUTPATIENT
Start: 2018-01-17 | End: 2018-02-08

## 2018-01-17 RX ORDER — SODIUM CHLORIDE 0.9 % (FLUSH) 0.9 %
1-10 SYRINGE (ML) INJECTION AS NEEDED
Status: DISCONTINUED | OUTPATIENT
Start: 2018-01-17 | End: 2018-01-17 | Stop reason: HOSPADM

## 2018-01-17 RX ORDER — OXYCODONE HYDROCHLORIDE 5 MG/1
5 TABLET ORAL EVERY 4 HOURS PRN
Qty: 20 TABLET | Refills: 0 | Status: SHIPPED | OUTPATIENT
Start: 2018-01-17 | End: 2018-02-08

## 2018-01-17 RX ORDER — PROMETHAZINE HYDROCHLORIDE 25 MG/ML
6.25 INJECTION, SOLUTION INTRAMUSCULAR; INTRAVENOUS ONCE AS NEEDED
Status: DISCONTINUED | OUTPATIENT
Start: 2018-01-17 | End: 2018-01-17

## 2018-01-17 RX ORDER — POLYETHYLENE GLYCOL 3350 17 G/17G
17 POWDER, FOR SOLUTION ORAL DAILY
Qty: 30 EACH | Refills: 2 | Status: SHIPPED | OUTPATIENT
Start: 2018-01-17 | End: 2018-02-08

## 2018-01-17 RX ORDER — ONDANSETRON 4 MG/1
4 TABLET, FILM COATED ORAL EVERY 8 HOURS PRN
Qty: 10 TABLET | Refills: 2 | Status: SHIPPED | OUTPATIENT
Start: 2018-01-17 | End: 2018-02-08

## 2018-01-17 RX ORDER — BUPIVACAINE HYDROCHLORIDE AND EPINEPHRINE 2.5; 5 MG/ML; UG/ML
INJECTION, SOLUTION INFILTRATION; PERINEURAL AS NEEDED
Status: DISCONTINUED | OUTPATIENT
Start: 2018-01-17 | End: 2018-01-17 | Stop reason: HOSPADM

## 2018-01-17 RX ORDER — IBUPROFEN 600 MG/1
600 TABLET ORAL EVERY 6 HOURS PRN
Status: DISCONTINUED | OUTPATIENT
Start: 2018-01-17 | End: 2018-01-17 | Stop reason: HOSPADM

## 2018-01-17 RX ORDER — OXYCODONE HYDROCHLORIDE 5 MG/1
5 TABLET ORAL EVERY 4 HOURS PRN
Status: DISCONTINUED | OUTPATIENT
Start: 2018-01-17 | End: 2018-01-17 | Stop reason: HOSPADM

## 2018-01-17 RX ORDER — ONDANSETRON 2 MG/ML
INJECTION INTRAMUSCULAR; INTRAVENOUS AS NEEDED
Status: DISCONTINUED | OUTPATIENT
Start: 2018-01-17 | End: 2018-01-17 | Stop reason: SURG

## 2018-01-17 RX ORDER — ATRACURIUM BESYLATE 10 MG/ML
INJECTION, SOLUTION INTRAVENOUS AS NEEDED
Status: DISCONTINUED | OUTPATIENT
Start: 2018-01-17 | End: 2018-01-17 | Stop reason: SURG

## 2018-01-17 RX ORDER — IBUPROFEN 600 MG/1
600 TABLET ORAL EVERY 6 HOURS
Qty: 60 TABLET | Refills: 0 | Status: SHIPPED | OUTPATIENT
Start: 2018-01-17 | End: 2018-02-08

## 2018-01-17 RX ORDER — ACETAMINOPHEN 325 MG/1
650 TABLET ORAL EVERY 6 HOURS
Status: DISCONTINUED | OUTPATIENT
Start: 2018-01-17 | End: 2018-01-17 | Stop reason: HOSPADM

## 2018-01-17 RX ORDER — CELECOXIB 200 MG/1
200 CAPSULE ORAL ONCE
Status: COMPLETED | OUTPATIENT
Start: 2018-01-17 | End: 2018-01-17

## 2018-01-17 RX ORDER — DEXAMETHASONE SODIUM PHOSPHATE 4 MG/ML
INJECTION, SOLUTION INTRA-ARTICULAR; INTRALESIONAL; INTRAMUSCULAR; INTRAVENOUS; SOFT TISSUE AS NEEDED
Status: DISCONTINUED | OUTPATIENT
Start: 2018-01-17 | End: 2018-01-17 | Stop reason: SURG

## 2018-01-17 RX ORDER — FAMOTIDINE 20 MG/1
20 TABLET, FILM COATED ORAL ONCE
Status: COMPLETED | OUTPATIENT
Start: 2018-01-17 | End: 2018-01-17

## 2018-01-17 RX ORDER — LIDOCAINE HYDROCHLORIDE 10 MG/ML
0.5 INJECTION, SOLUTION EPIDURAL; INFILTRATION; INTRACAUDAL; PERINEURAL ONCE AS NEEDED
Status: COMPLETED | OUTPATIENT
Start: 2018-01-17 | End: 2018-01-17

## 2018-01-17 RX ORDER — SODIUM CHLORIDE, SODIUM LACTATE, POTASSIUM CHLORIDE, CALCIUM CHLORIDE 600; 310; 30; 20 MG/100ML; MG/100ML; MG/100ML; MG/100ML
9 INJECTION, SOLUTION INTRAVENOUS CONTINUOUS
Status: DISCONTINUED | OUTPATIENT
Start: 2018-01-17 | End: 2018-01-17 | Stop reason: HOSPADM

## 2018-01-17 RX ORDER — GLYCOPYRROLATE 0.2 MG/ML
INJECTION INTRAMUSCULAR; INTRAVENOUS AS NEEDED
Status: DISCONTINUED | OUTPATIENT
Start: 2018-01-17 | End: 2018-01-17 | Stop reason: SURG

## 2018-01-17 RX ORDER — PROPOFOL 10 MG/ML
VIAL (ML) INTRAVENOUS AS NEEDED
Status: DISCONTINUED | OUTPATIENT
Start: 2018-01-17 | End: 2018-01-17 | Stop reason: SURG

## 2018-01-17 RX ORDER — LIDOCAINE HYDROCHLORIDE 10 MG/ML
INJECTION, SOLUTION EPIDURAL; INFILTRATION; INTRACAUDAL; PERINEURAL AS NEEDED
Status: DISCONTINUED | OUTPATIENT
Start: 2018-01-17 | End: 2018-01-17 | Stop reason: SURG

## 2018-01-17 RX ORDER — ACETAMINOPHEN 325 MG/1
650 TABLET ORAL ONCE
Status: COMPLETED | OUTPATIENT
Start: 2018-01-17 | End: 2018-01-17

## 2018-01-17 RX ORDER — DOCUSATE SODIUM 250 MG
250 CAPSULE ORAL 2 TIMES DAILY
Qty: 60 CAPSULE | Refills: 3 | Status: SHIPPED | OUTPATIENT
Start: 2018-01-17 | End: 2018-02-08

## 2018-01-17 RX ORDER — PREGABALIN 75 MG/1
150 CAPSULE ORAL ONCE
Status: COMPLETED | OUTPATIENT
Start: 2018-01-17 | End: 2018-01-17

## 2018-01-17 RX ORDER — NEOSTIGMINE METHYLSULFATE 1 MG/ML
INJECTION, SOLUTION INTRAVENOUS AS NEEDED
Status: DISCONTINUED | OUTPATIENT
Start: 2018-01-17 | End: 2018-01-17 | Stop reason: SURG

## 2018-01-17 RX ORDER — OXYCODONE HYDROCHLORIDE AND ACETAMINOPHEN 5; 325 MG/1; MG/1
1 TABLET ORAL ONCE AS NEEDED
Status: DISCONTINUED | OUTPATIENT
Start: 2018-01-17 | End: 2018-01-17

## 2018-01-17 RX ORDER — OXYCODONE HYDROCHLORIDE AND ACETAMINOPHEN 5; 325 MG/1; MG/1
1 TABLET ORAL ONCE AS NEEDED
Status: DISCONTINUED | OUTPATIENT
Start: 2018-01-17 | End: 2018-01-17 | Stop reason: HOSPADM

## 2018-01-17 RX ORDER — FENTANYL CITRATE 50 UG/ML
50 INJECTION, SOLUTION INTRAMUSCULAR; INTRAVENOUS
Status: DISCONTINUED | OUTPATIENT
Start: 2018-01-17 | End: 2018-01-17 | Stop reason: HOSPADM

## 2018-01-17 RX ORDER — ONDANSETRON 2 MG/ML
4 INJECTION INTRAMUSCULAR; INTRAVENOUS ONCE AS NEEDED
Status: COMPLETED | OUTPATIENT
Start: 2018-01-17 | End: 2018-01-17

## 2018-01-17 RX ADMIN — GLYCOPYRROLATE 0.2 MG: 0.2 INJECTION, SOLUTION INTRAMUSCULAR; INTRAVENOUS at 10:40

## 2018-01-17 RX ADMIN — ATRACURIUM BESYLATE 50 MG: 10 INJECTION, SOLUTION INTRAVENOUS at 09:15

## 2018-01-17 RX ADMIN — CEFOXITIN 2 G: 2 INJECTION, POWDER, FOR SOLUTION INTRAVENOUS at 09:30

## 2018-01-17 RX ADMIN — ACETAMINOPHEN 650 MG: 325 TABLET ORAL at 08:31

## 2018-01-17 RX ADMIN — NEOSTIGMINE METHYLSULFATE 2 MG: 1 INJECTION, SOLUTION INTRAVENOUS at 10:40

## 2018-01-17 RX ADMIN — LIDOCAINE HYDROCHLORIDE 60 MG: 10 INJECTION, SOLUTION EPIDURAL; INFILTRATION; INTRACAUDAL; PERINEURAL at 09:15

## 2018-01-17 RX ADMIN — ONDANSETRON 4 MG: 2 INJECTION INTRAMUSCULAR; INTRAVENOUS at 10:18

## 2018-01-17 RX ADMIN — DEXAMETHASONE SODIUM PHOSPHATE 8 MG: 4 INJECTION, SOLUTION INTRAMUSCULAR; INTRAVENOUS at 09:15

## 2018-01-17 RX ADMIN — FENTANYL CITRATE 150 MCG: 50 INJECTION, SOLUTION INTRAMUSCULAR; INTRAVENOUS at 10:45

## 2018-01-17 RX ADMIN — FENTANYL CITRATE 100 MCG: 50 INJECTION, SOLUTION INTRAMUSCULAR; INTRAVENOUS at 09:15

## 2018-01-17 RX ADMIN — PROPOFOL 150 MG: 10 INJECTION, EMULSION INTRAVENOUS at 09:15

## 2018-01-17 RX ADMIN — FAMOTIDINE 20 MG: 20 TABLET, FILM COATED ORAL at 08:31

## 2018-01-17 RX ADMIN — CELECOXIB 200 MG: 200 CAPSULE ORAL at 08:36

## 2018-01-17 RX ADMIN — SODIUM CHLORIDE, POTASSIUM CHLORIDE, SODIUM LACTATE AND CALCIUM CHLORIDE 100 ML/HR: 600; 310; 30; 20 INJECTION, SOLUTION INTRAVENOUS at 08:33

## 2018-01-17 RX ADMIN — ONDANSETRON 4 MG: 2 INJECTION INTRAMUSCULAR; INTRAVENOUS at 11:53

## 2018-01-17 RX ADMIN — PROMETHAZINE HYDROCHLORIDE 6.25 MG: 25 INJECTION INTRAMUSCULAR; INTRAVENOUS at 12:02

## 2018-01-17 RX ADMIN — PREGABALIN 150 MG: 75 CAPSULE ORAL at 08:31

## 2018-01-17 RX ADMIN — LIDOCAINE HYDROCHLORIDE 0.3 ML: 10 INJECTION, SOLUTION EPIDURAL; INFILTRATION; INTRACAUDAL; PERINEURAL at 08:33

## 2018-01-17 RX ADMIN — SODIUM CHLORIDE, POTASSIUM CHLORIDE, SODIUM LACTATE AND CALCIUM CHLORIDE: 600; 310; 30; 20 INJECTION, SOLUTION INTRAVENOUS at 10:30

## 2018-01-17 NOTE — OP NOTE
LAPAROSCOPIC ASSISTED VAGINAL HYSTERECTOMY BILATERAL SALPINGO OOPHORECTOMY  Procedure Note    Simi Royal  1/17/2018    Pre-op Diagnosis:   Adenocarcinoma in situ of cervix [D06.9]    Post-op Diagnosis:     Post-Op Diagnosis Codes:     * Adenocarcinoma in situ of cervix [D06.9]    Procedure(s):  LAPAROSCOPIC ASSISTED VAGINAL HYSTERECTOMY BILATERAL SALPINGO OOPHORECTOMY    Surgeon(s):  Kellen David MD    Anesthesia: General    Staff:   Circulator: Chuy Harvey RN; Solange Hernández RN  Scrub Person: Kathi Garcia    Estimated Blood Loss: 50mL    Specimens:                  Order Name Source Comment Collection Info Order Time   TISSUE PATHOLOGY EXAM Uterus with Cervix, Bilateral Tubes and Ovaries  Collected By: Kellen David MD 1/17/2018 10:09 AM         Drains:       [REMOVED] Urethral Catheter 01/17/18 0930 16 (Removed)   Removed 01/17/18 1034        Findings:   1. Normal appearing ovaries bilaterally  2. Normal appearing fallopian pedicles s/p tubal ligation  3. Normal appearing uterus.     Complications: none    Procedure:    After consent was obtained, the patient was taken to  the operating room and underwent general endotracheal anesthesia. The patient  was prepped and draped in a normal sterile fashion in the dorsal lithotomy  position in [facility]. Her arms were tucked at the side. The beanbag was  deflated. Positioning was found to be adequate. The abdomen, perineum and  vagina were prepped and draped in the usual sterile fashion. Carter catheter  was anchored. Hulka uterine manipulator was placed without difficulty.    Attention was turned to the abdomen.      Prior to the incision, skin was injected with 0.5% Marcaine with epinephrine.    An incision in the umbilicus was made with a scalpel large enough to  accommodate a 12 mm trocar. A 12 mm trocar was inserted at the umbilicus in  the open technique without difficulty. Laparoscope was introduced to confirm  positioning.  The abdomen was insufflated to a pressure of 15 mmHg with CO2  gas. Bilateral lower quadrant 5 mm trocars were placed under direct  visualization after injection with 0.5% Marcaine in skin incisions with a  scalpel. The above findings were noted. Ureters were visualized bilaterally in normal anatomic position. Peritoneal windows were created in the broad ligament bilaterally and the infundibulo-pelvic ligaments were transected with the Ace Harmonic scalpel. Round ligaments were divided.  Anterior and posterior leaves of the broad ligament were  divided in a similar fashion. A bladder flap was carefully developed. Uterine  vessels were isolated from surrounding structures. At that point, good hemostasis was noted and the decision was made to proceed to the vaginal portion of the procedure. Laparoscope was removed. CO2 gas was allowed to escape from the abdominal cavity.       Attention was turned to the vagina. Uterine manipulator was removed. Two single tooth tenaculums were placed at 3 o'clock and 9 o'clock. Retractors were used to aide with visualization. The cervical vaginal junction was circumferentially injected  with 0.25% Marcaine with epinephrine. Bovie electrocautery was used to perform  a circumferential colpotomy. Anterior and posterior cul-de-sacs were sharply  entered without difficulty. Retractors were placed in the defects. The  Endoseal device was used in an alternating fashion to divide the uterosacral  ligaments, cardinal ligament complexes, uterine vessels and any residual  attachments of the specimen to the patient. The specimen was removed intact,  inspected and handed off the field for permanent pathology. The pelvis was  copiously irrigated and aspirated. Left uterine artery pedicle was noted to be bleeding and was made hemostatic using a transfiction suture with 0 vicryl. Afterward, all pedicles were visualized and found to be hemostatic. Vaginal cuff angles were secured with 0 Vicryl suture  taking  care to incorporate the uterosacral ligaments. The vaginal cuff was closed  with 0 Vicryl suture in a running, locking fashion. Excellent hemostasis was noted.       Attention was again turned to the abdominal portion of the procedure. The  abdomen was again insufflated to a pressure of 15 mmHg with CO2 gas. The pelvis was irrigated  and aspirated. Pedicles were inspected and good hemostasis was noted. Trocars were removed under direct visualization. CO2 gas was allowed to escape from the abdominal cavity. The fascia at the umbilicus was  closed with 0 Vicryl in a figure-of-eight stitch. No fascial defects could be  palpated. The skin was closed with 3-0 Monocryl using subcuticular stitches,  and dressed with Dermabond. Patient tolerated the procedure well. Sponge,  laps and needle counts were correct x3. The patient was awakened from  anesthesia and transferred to the PACU in stable condition. There were no  immediate complications.       Attending physician, Dr. Kellen David, was present and scrubbed for the entire case.     Chyna Braden MD     Date: 1/17/2018  Time: 10:45 AM     I participated in all key aspects of this patient's surgical care.  The resident acted under my direct supervision for the entirety of the procedure.    Kellen David MD  01/17/18

## 2018-01-17 NOTE — ANESTHESIA PREPROCEDURE EVALUATION
Anesthesia Evaluation     Patient summary reviewed and Nursing notes reviewed   NPO Solid Status: > 8 hours  NPO Liquid Status: > 8 hours     Airway   Mallampati: I  TM distance: >3 FB  Neck ROM: full  no difficulty expected  Dental      Pulmonary    (-) pneumonia, COPD, asthma, shortness of breath, not a smoker  Cardiovascular     (-) past MI, CAD, dysrhythmias, angina, cardiac stents      Neuro/Psych  (-) seizures, TIA, CVA  GI/Hepatic/Renal/Endo    (-) liver disease, no renal disease, diabetes, hypothyroidism    Musculoskeletal     Abdominal    Substance History      OB/GYN          Other                                                Anesthesia Plan    ASA 1     general   (Small ETT  (Hx of sore throat after OPT Cervical Bx last month)  Propofol Infusion as part of Anti PONV tech )  intravenous induction   Anesthetic plan and risks discussed with patient.    Plan discussed with CRNA.

## 2018-01-17 NOTE — PLAN OF CARE
Problem: Perioperative Period (Adult)  Goal: Signs and Symptoms of Listed Potential Problems Will be Absent or Manageable (Perioperative Period)  Outcome: Ongoing (interventions implemented as appropriate)   01/17/18 1554   Perioperative Period   Problems Assessed (Perioperative Period) all   Problems Present (Perioperative Period) physiologic stress response;situational response

## 2018-01-17 NOTE — H&P (VIEW-ONLY)
Simi Royal  [id number]  1970      Reason for Visit:  Postoperative evaluation    History of Present Illness:  Patient is a very pleasant 47 y.o. woman who presents for a post operative evaluation status post Cold Knife Cone performed on 12/18/17 for AIS diagnosed on LEEP tonya .  Surgery was uncomplicated.  Today, patient notes normal bowel and bladder function.  Her pain is well controlled. She has questions about resuming normal activities.     Past Medical History, Past Surgical History, Social History, Family History have been reviewed and are without significant changes except as mentioned.    Review of Systems   A comprehensive 12 point review of systems was performed and was negative except as mentioned.    Medications:  The current medication list was reviewed in the EMR    ALLERGIES:  No Known Allergies        /73  Pulse 74  Temp 98 °F (36.7 °C) (Temporal Artery )   Resp 14  Wt 73.9 kg (163 lb)  SpO2 98%  BMI 29.34 kg/m2       Physical Exam  Constitutional:  Patient is a pleasant woman in no acute distress.  Gastrointestinal: Abdomen is soft and non tender.  There is no mass palpated.  There is no rebound or guarding.   Extremities:  Bilateral lower extremities are non-tender.  Gynecologic:GYNECOLOGIC:  External genitalia are free from lesion. On speculum examination, the cervix was healing well from CKC. On bimanual examination no mass was appreciated.  Uterus was normal in size and shape. There is no cervical motion or uterine tenderness. No cervical mass was palpated. Parametria were smooth. Rectovaginal exam was deferred.       Pathology:    Final Diagnosis   1. CERVICAL CONE, EXCISION:   Ectocervical tissue with focal acute and chronic cervicitis and reactive changes.   Inflammation and reactive changes compatible with prior surgical procedure with limited transformation zone present with reactive appearing endocervical tissue present.  Changes consistent with prior  "surgical procedure extend to the endocervical margins of this specimen (See comment)  2. CERVICAL \"ADDITIONAL CERVICAL CONE\":  Benign endocervical tissue.   Acute and chronic inflammation.   No dysplasia identified. (See comment)         ASSESSMENT/PLAN:  Simi Royal returns for a post-operative evaluation today s/p Cold Knife Cone for AIS.  All pathology reports were given to patient.      Cone margins negative for malignancy. Cervix healing well post procedure. Patient already scheduled for LAVH/BSO on 1/17/18.    Risks and benefits of surgery were discussed.  This included, but was not limited to, infection and bleeding like when the skin is cut; damage to surrounding structures; and incisional complications.  Risk of DVT was addressed for major surgeries.  Standard of care efforts to minimize these risks were reviewed.  Typical hospital stay and recovery were discussed as well as post-procedure precautions.  Surgical implications of chronic illnesses on recovery and surgical outcome were reviewed.     Discussed risks of removal of ovaries including surgical menopause, cardiovascular and bone effects. Also discussed risks with retention of ovaries including risk of needing additional surgery, overall lifetime risks of ovarian cancer. Discussed average age of menopause being 51. Patient desires bilateral salpingectomy at time of hysterectomy.     Patient verbalized understanding of the plan including the risks and benefits.  Appropriate perioperative testing including laboratory evaluation, EKG as clinically indicated, chest x-ray as clinically indicated, and preadmission evaluation were all ordered as a part of this patient's care.     Overall, the patient is very pleased with her care.  I recommended continuation of post operative precautions as discussed.     She is to present for scheduled Laparoscopic Assisted Vaginal Hysterectomy, bilateral salpingectomy on 1/17/18.     Chyna Braden, " MD      Patient was seen and examined with Dr. Braden,  resident, who performed portions of the examination and documentation for this patient's care under my direct supervision.    Kellen David MD  01/13/18  10:04 AM

## 2018-01-17 NOTE — INTERVAL H&P NOTE
H&P reviewed. The patient was examined and there are no changes to the H&P.     /72 (BP Location: Right arm, Patient Position: Lying)  Pulse 72  Temp 98.2 °F (36.8 °C) (Temporal Artery )   Resp 20  SpO2 100%      ALY ORTEGA.    I saw and evaluated the patient. I agree with the findings and the plan of care as documented in the note.    Kellen David MD  01/17/18  8:47 AM

## 2018-01-17 NOTE — ANESTHESIA POSTPROCEDURE EVALUATION
Patient: Simi Royal    Procedure Summary     Date Anesthesia Start Anesthesia Stop Room / Location    01/17/18 0911 1052  [facility] / [facility]       Procedure Diagnosis Surgeon Provider    LAPAROSCOPIC ASSISTED VAGINAL HYSTERECTOMY BILATERAL SALPINGO OOPHORECTOMY (N/A Abdomen) Adenocarcinoma in situ of cervix  (Adenocarcinoma in situ of cervix [D06.9]) MD Shamir Rose MD          Anesthesia Type: general  Last vitals  BP   96/54 (01/17/18 1050)   Temp   97.9 °F (36.6 °C) (01/17/18 1050)   Pulse   55 (01/17/18 1050)   Resp   15 (01/17/18 1050)     SpO2   100 % (01/17/18 1050)     Post Anesthesia Care and Evaluation    Patient location during evaluation: PACU  Patient participation: complete - patient participated  Level of consciousness: awake and alert  Pain score: 0  Pain management: adequate  Airway patency: patent  Anesthetic complications: No anesthetic complications  PONV Status: none  Cardiovascular status: hemodynamically stable and acceptable  Respiratory status: nonlabored ventilation, acceptable and nasal cannula  Hydration status: acceptable

## 2018-01-17 NOTE — PLAN OF CARE
Problem: Patient Care Overview (Adult)  Goal: Plan of Care Review  Outcome: Ongoing (interventions implemented as appropriate)   01/17/18 3167   Coping/Psychosocial Response Interventions   Plan Of Care Reviewed With patient;daughter;sibling   Patient Care Overview   Progress progress toward functional goals as expected

## 2018-01-17 NOTE — ANESTHESIA PROCEDURE NOTES
Airway  Urgency: elective    Airway not difficult    General Information and Staff    Patient location during procedure: OR    Indications and Patient Condition  Indications for airway management: airway protection    Preoxygenated: yes  MILS not maintained throughout  Mask difficulty assessment: 1 - vent by mask    Final Airway Details  Final airway type: endotracheal airway      Successful airway: ETT  Cuffed: yes   Successful intubation technique: direct laryngoscopy  Endotracheal tube insertion site: oral  Blade: Ousmane  Blade size: #3  ETT size: 6.0 mm  Cormack-Lehane Classification: grade I - full view of glottis  Placement verified by: chest auscultation and capnometry   Number of attempts at approach: 1    Additional Comments  Negative epigastric sounds, Breath sound equal bilaterally with symmetric chest rise and fall

## 2018-01-18 LAB
CYTO UR: NORMAL
LAB AP CASE REPORT: NORMAL
LAB AP CLINICAL INFORMATION: NORMAL
Lab: NORMAL
PATH REPORT.FINAL DX SPEC: NORMAL
PATH REPORT.GROSS SPEC: NORMAL

## 2018-02-01 ENCOUNTER — TELEPHONE (OUTPATIENT)
Dept: GYNECOLOGIC ONCOLOGY | Facility: CLINIC | Age: 48
End: 2018-02-01

## 2018-02-01 NOTE — TELEPHONE ENCOUNTER
[facility]        Phone Number: [phone]                     States she is experiencing different problems after surgery. Please call.              Returned call to pt.  States she had surgery 1-17-18, no bleeding until now, which is very light, not even wearing a pad, no pain.  States she was feeling good, went to the grocery store and the bleeding started after.  Instructed her to monitor, call if increase in bleeding, pain, or any problems or concerns, o/w has appt 2-8-18.  Pt v/u, will do as instructed.

## 2018-02-08 ENCOUNTER — OFFICE VISIT (OUTPATIENT)
Dept: GYNECOLOGIC ONCOLOGY | Facility: CLINIC | Age: 48
End: 2018-02-08

## 2018-02-08 VITALS
HEART RATE: 94 BPM | RESPIRATION RATE: 14 BRPM | SYSTOLIC BLOOD PRESSURE: 133 MMHG | WEIGHT: 163 LBS | DIASTOLIC BLOOD PRESSURE: 81 MMHG | TEMPERATURE: 98.2 F | OXYGEN SATURATION: 98 % | BODY MASS INDEX: 28.87 KG/M2

## 2018-02-08 DIAGNOSIS — E28.39 HYPOESTROGENISM: Primary | ICD-10-CM

## 2018-02-08 DIAGNOSIS — Z98.890 POST-OPERATIVE STATE: ICD-10-CM

## 2018-02-08 PROCEDURE — 99024 POSTOP FOLLOW-UP VISIT: CPT | Performed by: OBSTETRICS & GYNECOLOGY

## 2018-02-08 NOTE — PROGRESS NOTES
Simi Royal  [id number]  1970      Reason for Visit:  Postoperative evaluation, hypoestrogen    History of Present Illness:  Patient is a very pleasant 48 y.o. woman who presents for a post operative evaluation status post LAVH, BSO performed on 1/17/18.  Surgery and hospital course were uncomplicated.      Today, patient notes normal bowel and bladder function.  Her pain is well controlled. She has questions about resuming normal activities.  She is intermittently tearful.  She complains of hot flashes and hypo-estrogen symptoms.  She did not want hormone replacement at the time of discharge.  She is not going out and has changed her evening habits.  She has a depressed affect.  She notes that she is mainly watching TV and has concerns about her lack of activity.    Past Medical History, Past Surgical History, Social History, Family History have been reviewed and are without significant changes except as mentioned.    Review of Systems   A comprehensive 12 point review of systems was performed and was negative except as mentioned.    Medications:  The current medication list was reviewed in the EMR    ALLERGIES:  No Known Allergies        /81  Pulse 94  Temp 98.2 °F (36.8 °C) (Temporal Artery )   Resp 14  Wt 73.9 kg (163 lb)  SpO2 98%  BMI 28.87 kg/m2       Physical Exam  Constitutional:  Patient is a pleasant woman in no acute distress.  Gastrointestinal: Abdomen is soft and appropriately tender.  There is no mass palpated.  There is no rebound or guarding.  Incision(s) is clean, dry and intact except for minimal drainage reported at the umbilicus, no active drainage noted.  Dressing placed.  Extremities:  Bilateral lower extremities are non-tender.  Gynecologic:GYNECOLOGIC:  External genitalia are free from lesion. On speculum examination, the vaginal cuff was intact and no lesions were appreciated.  No active bleeding was identified.  Silver nitrate was focally applied.  On bimanual  examination, no fullness was appreciated.  Uterus, cervix and adnexa were absent.  There was no significant tenderness.  Rectovaginal exam was deferred.      PATHOLOGY:  Final Diagnosis    UTERUS AND CERVIX WITH BILATERAL ADNEXA, RADICAL HYSTERECTOMY:  Focal residual adenocarcinoma in-situ (block 1-B).  Very focal residual high-grade squamous dysplasia (1-C).  Extensive previous site changes consistent with previous cone biopsy.  No invasive carcinoma identified.  Proliferative phase endometrium with adenomyosis.  Benign ovaries with physiologic structures.  The ectocervical and parametrial margins are clear.         ASSESSMENT/PLAN:  Simi Royal returns for a post-operative evaluation today.  All pathology reports were given to patient.    Regarding patient's symptoms of hypo-estrogenism and depressed mood, she was prescribed Premarin 0.65  by mouth daily.  It was advised that she can and will return for physician within the next few months for monitoring of hormone replacement.  It was advised that she undergo Pap smears given the pathology report and adenocarcinoma in situ of the cervix on pathology.      Overall, the patient is very pleased with her care.  I recommended continuation of post operative precautions as discussed.     She is to Return for on an as-needed basis.    Note: Speech recognition transcription software was used to dictate portions of this document.  An attempt at proofreading has been made though minor errors in transcription may still be present.  Please do not hesitate to call our office with any questions.    Kellen David MD

## 2018-02-19 ENCOUNTER — TELEPHONE (OUTPATIENT)
Dept: GYNECOLOGIC ONCOLOGY | Facility: CLINIC | Age: 48
End: 2018-02-19

## 2018-02-19 NOTE — TELEPHONE ENCOUNTER
[facility]        Phone Number: [phone]                     Pt still having symptoms and wants to know if she can return to work.      Returned pt's call.  States has to wear a panty liner daily for spotting, she also still has insomnia, was started on HRT 2-8-18 and says is not helping that.  Instructed pt per Kathi Haley's v/o, to stay on her hormone, try OTC sleep aid, call if not improvement.  Pt v/u, will do as instructed.

## 2018-02-21 ENCOUNTER — TELEPHONE (OUTPATIENT)
Dept: GYNECOLOGIC ONCOLOGY | Facility: CLINIC | Age: 48
End: 2018-02-21

## 2018-02-21 NOTE — TELEPHONE ENCOUNTER
----- Message from Chu Call sent at 2/21/2018 12:01 PM EST -----  Regarding: side effects  Contact: [phone]  Still having side effects from medication............  Returned pt's call.  States has been doing the Melatonin and it is not helping her with sleep.  I informed  , v/o to send in Ambien 10 mg po at HS prn insomnia no refill.  Script called to pharmacy, instructed  Pt to call if needed.  Pt v/u, will call if needed.

## 2018-03-14 ENCOUNTER — TELEPHONE (OUTPATIENT)
Dept: GYNECOLOGIC ONCOLOGY | Facility: CLINIC | Age: 48
End: 2018-03-14

## 2018-03-14 NOTE — TELEPHONE ENCOUNTER
"----- Message from Mikayla Blackmon sent at 3/14/2018 10:01 AM EDT -----  Contact: [phone]  States she is not sleeping at all after prescriptions, still bleeding a little and thinks she is just not alright after surgery. She stated \"she just can't go to work with the crazy, literally\". Patient gave me her call back number and was tearful when we hung up the phone.  Returned pt's call. States was doing crazy things using the sleep aid, she just is not able to do it.  States she called a raffy she dated 2 years ago, said she was having a dinner party in May, and invited him.  She is still having some spotting and says she feels like she is in the same boat she was in before she had the surgery.  Offered pt an appt to be seen by Dr. David, pt accepted, transferred to reception to schedule.  "

## 2018-03-28 ENCOUNTER — OFFICE VISIT (OUTPATIENT)
Dept: GYNECOLOGIC ONCOLOGY | Facility: CLINIC | Age: 48
End: 2018-03-28

## 2018-03-28 VITALS
WEIGHT: 164 LBS | TEMPERATURE: 97.8 F | OXYGEN SATURATION: 98 % | RESPIRATION RATE: 14 BRPM | DIASTOLIC BLOOD PRESSURE: 70 MMHG | HEART RATE: 75 BPM | SYSTOLIC BLOOD PRESSURE: 136 MMHG | BODY MASS INDEX: 29.05 KG/M2

## 2018-03-28 DIAGNOSIS — F41.9 ANXIETY: ICD-10-CM

## 2018-03-28 DIAGNOSIS — G47.9 SLEEP DISTURBANCE: Primary | ICD-10-CM

## 2018-03-28 DIAGNOSIS — Z98.890 POST-OPERATIVE STATE: ICD-10-CM

## 2018-03-28 PROCEDURE — 99024 POSTOP FOLLOW-UP VISIT: CPT | Performed by: OBSTETRICS & GYNECOLOGY

## 2018-03-31 NOTE — PROGRESS NOTES
Simi Royal  [id number]  1970      Reason for Visit:  Postoperative evaluation, hypoestrogen    History of Present Illness:  Patient is a very pleasant 48 y.o. woman who presents for a post operative evaluation status post LAVH, BSO performed on 1/17/18.  Surgery and hospital course were uncomplicated.      Today, patient notes normal bowel and bladder function.  Her pain is well controlled. Patient noted spotting and this resolved 3/23.  She denies hot flashes during the day, but notes some night sweats.  She c/o HAs and migraine x1 and wonders if this is related to estrogen Rx.  She stopped her Premarin 2 days ago.  She c/o dizzy spells.  She notes a significant history of insomnia and this has gotten worse since surgical menopause.  She notes difficulty falling asleep and that Ambien did not help.  She has not tried Melatonin.  She has not tried meditation. She uses caffeine throughout the day.     Past Medical History, Past Surgical History, Social History, Family History have been reviewed and are without significant changes except as mentioned.    Review of Systems   A comprehensive 12 point review of systems was performed and was negative except as mentioned.    Medications:  The current medication list was reviewed in the EMR    ALLERGIES:  No Known Allergies        /70   Pulse 75   Temp 97.8 °F (36.6 °C) (Temporal Artery )   Resp 14   Wt 74.4 kg (164 lb)   SpO2 98%   BMI 29.05 kg/m²        Physical Exam  Constitutional:  Patient is a pleasant woman in no acute distress.  Gastrointestinal: Abdomen is soft and appropriately tender.  There is no mass palpated.  There is no rebound or guarding.  Incision(s) is clean, dry and intact except for minimal drainage reported at the umbilicus, no active drainage noted.  Dressing placed.  Extremities:  Bilateral lower extremities are non-tender.  Gynecologic:GYNECOLOGIC:  External genitalia are free from lesion. On speculum examination, the  vaginal cuff was intact and no lesions were appreciated.  No bleeding or bloody d/c was identified. On bimanual examination, no fullness was appreciated.  Uterus, cervix and adnexa were absent.  There was no significant tenderness.  Rectovaginal exam was deferred.      PATHOLOGY:  Final Diagnosis    UTERUS AND CERVIX WITH BILATERAL ADNEXA, RADICAL HYSTERECTOMY:  Focal residual adenocarcinoma in-situ (block 1-B).  Very focal residual high-grade squamous dysplasia (1-C).  Extensive previous site changes consistent with previous cone biopsy.  No invasive carcinoma identified.  Proliferative phase endometrium with adenomyosis.  Benign ovaries with physiologic structures.  The ectocervical and parametrial margins are clear.         ASSESSMENT/PLAN:  Simi Royal returns for a post-operative evaluation today.  All pathology reports were given to patient.    Regarding patient's symptoms of hypo-estrogenism, I recommended d/c premarin and start black cohosh as it is less likely to contribute to HA.    Sleep disturbance  -chronic with acute exacerbation  -Referral to Dr. Raj Nam for sleep counseling.  -Melatonin discussed  -d/c all caffeine after am    She is to Return for on an as-needed basis.    Note: Speech recognition transcription software was used to dictate portions of this document.  An attempt at proofreading has been made though minor errors in transcription may still be present.  Please do not hesitate to call our office with any questions.    Kellen David MD

## 2018-05-02 ENCOUNTER — OFFICE VISIT (OUTPATIENT)
Dept: PSYCHIATRY | Facility: CLINIC | Age: 48
End: 2018-05-02

## 2018-05-02 VITALS — SYSTOLIC BLOOD PRESSURE: 138 MMHG | DIASTOLIC BLOOD PRESSURE: 88 MMHG | HEIGHT: 62 IN

## 2018-05-02 DIAGNOSIS — F51.05 INSOMNIA DUE TO MENTAL CONDITION: ICD-10-CM

## 2018-05-02 DIAGNOSIS — F41.1 GENERALIZED ANXIETY DISORDER: ICD-10-CM

## 2018-05-02 DIAGNOSIS — F33.1 MODERATE EPISODE OF RECURRENT MAJOR DEPRESSIVE DISORDER (HCC): Primary | ICD-10-CM

## 2018-05-02 PROCEDURE — 90792 PSYCH DIAG EVAL W/MED SRVCS: CPT | Performed by: NURSE PRACTITIONER

## 2018-05-02 RX ORDER — TRAZODONE HYDROCHLORIDE 50 MG/1
TABLET ORAL
Qty: 60 TABLET | Refills: 1 | Status: SHIPPED | OUTPATIENT
Start: 2018-05-02 | End: 2020-09-16

## 2018-05-02 RX ORDER — VENLAFAXINE HYDROCHLORIDE 37.5 MG/1
37.5 CAPSULE, EXTENDED RELEASE ORAL DAILY
Qty: 15 CAPSULE | Refills: 0 | Status: SHIPPED | OUTPATIENT
Start: 2018-05-02 | End: 2020-09-16

## 2018-05-02 NOTE — PROGRESS NOTES
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 female who is here today for initial appointment. 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 She raised her 3 children who are all out of the house now. Dr. David has referred patient after she had total hysterectomy for depression anxiety and insomnia . Couldn't tolerate HRT with headaches and is not on replacement currently.    Chief Complaint:  MDD recurrent moderate , insomnia       History of Present Illness Patient presents alone for psychiatric evaluation. The patient reports depressive symptoms including depressed mood, crying spells, insomnia, overeating, anhedonia, feelings of guilt, feelings of hopelessness, feelings of helplessness, feelings of worthlessness, low energy, difficulty concentrating and psychomotor agitation, and have caused impairment in important areas of functioning.  Depression rated 7/10 with 10 being the worst. She has had this in past with divorce and episodically however now lasting longer and increased intensity in symptoms over past 3 months. The patient reports the following symptoms of sleep disturbance: difficulty falling asleep, frequent awakenings, early morning awakening.  The patients reports sleeping approximately 4 hours per night for several months. She reports hot flashes are rare and not intense. She works but becomes tearful when talks about her personal life so avoids talking about it to friends at work. Her eldest daughter lives in [facility] working and her middle child daughter is at [facility]. Her son is 19yo and wasn't doing well in [facility] so moved to his Dad's in [place] and going to school there and working. She now has empty house and with the hysterectomy her mood has gotten significantly more depressed. She reports being a worrier and concerned about her children. This has increased as well after hysterectomy and not  "sleeping well. She raised the three after the divorce. She states her youngest was 3yo when she found out her  was having an affair with a 18yo who worked for him and it had been going on for sometime. She states it shocked her and didn't want a divorce they went to therapy a couple times but she knew is was done. She reports depression then and took a year to really get out of it she states. She denies SI/HI or AVH. She denies alcohol abuse denies illicit drugs ever. She reports she \"dove in to caring for the three kids and worked and did what I could do to make a happy home for them\" she states ex- was not involved with the kids or as supportive as she would think he should have been. He is now on his 3rd marriage. Patient never remarried and only had one longer lasting relationship. She states now she is not that motivated to meet someone. She has spiritual conflict wondering why she is being punished throughout life. She has poor self esteem, she reports her mother never complimented she or her sister, never attended any of her school activities and she can't even remembering her mom being at her high school graduation. Patient states that was hurtful throughout her growing up years. Her dad was supportive and caring. Pt denies beny, OCD, or PTSD symptoms. She denies panic.        The following portions of the patient's history were reviewed and updated as appropriate: allergies, current medications, past family history, past medical history, past social history, past surgical history and problem list.      Past Psych History: has been on antidepressants episodically through PCP, once during divorce, she has been on Paxil and Celexa both separate times MDD and anxiety resolved.     Substance Abuse:   denies    ABUSE HX: emotional and verbal from her mother growing up and emotional neglect with not showing up for any of her school activities or sports.   LEGAL HX: denies     DOLORES REVIEWED: no red " flags      Family Psychiatric History: anxiety depression   family history is not on file.      Social History: born in [place] moved to [place] when she was [age] years old. Raised by biological parents, graduated from high school.  by 20 yo and first baby at 21 yo, had three children  15 years,  in 2005. 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 She has never remarried and has strong ilya in God but feels she is punished, she is working with her  regarding this she states. Her three children are out of the home. She has family and friend emotional support and realized it more with her total hysterectomy being so supportive and caring.     Medical/Surgical History:  Past Medical History:   Diagnosis Date   • Cancer     uterine cancer per recent biopsy    • History of anxiety      Past Surgical History:   Procedure Laterality Date   • APPENDECTOMY     • CHOLECYSTECTOMY     • ENDOMETRIAL ABLATION     • HYSTEROSCOPY  12/2017    biopsy    • LAPAROSCOPIC ASSISTED VAGINAL HYSTERECTOMY SALPINGO OOPHORECTOMY N/A 1/17/2018    Procedure: LAPAROSCOPIC ASSISTED VAGINAL HYSTERECTOMY BILATERAL SALPINGO OOPHORECTOMY;  Surgeon: Kellen David MD;  Location: [facility];  Service:    • TUBAL ABDOMINAL LIGATION       PATHOLOGY:  Final Diagnosis    UTERUS AND CERVIX WITH BILATERAL ADNEXA, RADICAL HYSTERECTOMY:  Focal residual adenocarcinoma in-situ (block 1-B).  Very focal residual high-grade squamous dysplasia (1-C).  Extensive previous site changes consistent with previous cone biopsy.  No invasive carcinoma identified.  Proliferative phase endometrium with adenomyosis.  Benign ovaries with physiologic structures.  The ectocervical and parametrial margins are clear.       No Known Allergies    Current Medications:   Current Outpatient Prescriptions   Medication Sig Dispense Refill   • traZODone (DESYREL) 50 MG tablet 1-2 tablet at  "bedtime 60 tablet 1   • venlafaxine XR (EFFEXOR-XR) 37.5 MG 24 hr capsule Take 1 capsule by mouth Daily. 15 capsule 0     No current facility-administered medications for this visit.          Review of Systems   Constitutional: Negative for appetite change, chills, diaphoresis, fatigue, fever and unexpected weight change.   HENT: Negative for hearing loss, sore throat, trouble swallowing and voice change.    Eyes: Negative for photophobia and visual disturbance.   Respiratory: Negative for cough, chest tightness and shortness of breath.    Cardiovascular: Negative for chest pain and palpitations.   Gastrointestinal: Negative for abdominal pain, constipation, nausea and vomiting.   Endocrine: Negative for cold intolerance and heat intolerance.   Genitourinary: Negative for dysuria and frequency.   Musculoskeletal: Negative for arthralgia, back pain, joint swelling and neck stiffness.   Skin: Negative for color change and wound.   Allergic/Immunologic: Negative for environmental allergies and immunocompromised state.   Neurological: Negative for dizziness, tremors, seizures, syncope, weakness, light-headedness and headaches.   Hematological: Negative for adenopathy. Does not bruise/bleed easily.      Objective   Physical Exam  Blood pressure 138/88, height 157.5 cm (62\").    Mental Status Exam:   Appearance: appropriate  Hygiene:   good  Cooperation:  Cooperative  Eye Contact:  Good  Psychomotor Behavior:  Appropriate  Mood:  within normal limits  Affect:  Appropriate  Hopelessness: Denies  Speech:  Normal  Thought Process:  Linear  Thought Content:  Normal  Suicidal:  None  Homicidal:  None  Hallucinations:  None  Delusion:  None  Memory:  Intact  Orientation:  Person, Place, Time and Situation  Reliability:  fair  Insight:  Fair  Judgement:  Fair  Impulse Control:  Fair  Physical/Medical Issues:  No       Short-term goals: Patient will be compliant with clinic appointments.  Patient will be engaged in therapy, " medication compliant with minimal side effects. Patient  will report decrease of symptoms and frequency.    Long-term goals: Patient will have minimal symptoms of mental health disorder with continued treatment. Patient will be compliant with treatment and appointments.       Problem list: MDD recurrent mod, insomnia, anxiety , phase of life issues   Strengths: patient appears motivated for treatment is currently engaged and compliant         Assessment/Plan   Diagnoses and all orders for this visit:    Moderate episode of recurrent major depressive disorder    Generalized anxiety disorder    Insomnia due to mental condition    Other orders  -     traZODone (DESYREL) 50 MG tablet; 1-2 tablet at bedtime  -     venlafaxine XR (EFFEXOR-XR) 37.5 MG 24 hr capsule; Take 1 capsule by mouth Daily.      A psychological evaluation was conducted in order to assess past and current level of functioning. Areas assessed included, but were not limited to: perception of social support, perception of ability to face and deal with challenges in life (positive functioning), anxiety symptoms, depressive symptoms, perspective on beliefs/belief system, coping skills for stress, intelligence level,  Therapeutic rapport was established. Interventions conducted today were geared towards incorporating medication management along with support for continued therapy. Education was also provided as to the med management with this provider and what to expect in subsequent sessions.  Patient lives in [place], [place] will follow up with her in [place] office closer for her to drive.   Recommend cont therapy  Begin Effexor XR 37.5mg PO one QD  Begin Trazodone for sleep  Call after 5 days if not sleeping, call if side effects from medication or concerns.     We discussed risks, benefits,goals and side effects of the above medication and the patient was agreeable with the plan.Patient was educated on the importance of compliance with treatment and  follow-up appointments.To call for questions or concerns and return early if necessary. Crisis plan reviewed including going to the Emergency department.     Return in about 4 weeks (around 5/30/2018).

## 2018-08-17 ENCOUNTER — TRANSCRIBE ORDERS (OUTPATIENT)
Dept: LAB | Facility: HOSPITAL | Age: 48
End: 2018-08-17

## 2018-08-17 ENCOUNTER — LAB (OUTPATIENT)
Dept: LAB | Facility: HOSPITAL | Age: 48
End: 2018-08-17

## 2018-08-17 DIAGNOSIS — R45.89 DEPRESSED MOOD: ICD-10-CM

## 2018-08-17 DIAGNOSIS — R53.83 FATIGUE, UNSPECIFIED TYPE: Primary | ICD-10-CM

## 2018-08-17 DIAGNOSIS — R63.5 ABNORMAL WEIGHT GAIN: ICD-10-CM

## 2018-08-17 DIAGNOSIS — R53.83 FATIGUE, UNSPECIFIED TYPE: ICD-10-CM

## 2018-08-17 LAB
25(OH)D3 SERPL-MCNC: 23.7 NG/ML
ANION GAP SERPL CALCULATED.3IONS-SCNC: 9 MMOL/L (ref 3–11)
BUN BLD-MCNC: 10 MG/DL (ref 9–23)
BUN/CREAT SERPL: 10.3 (ref 7–25)
CALCIUM SPEC-SCNC: 9.7 MG/DL (ref 8.7–10.4)
CHLORIDE SERPL-SCNC: 105 MMOL/L (ref 99–109)
CO2 SERPL-SCNC: 27 MMOL/L (ref 20–31)
CREAT BLD-MCNC: 0.97 MG/DL (ref 0.6–1.3)
DEPRECATED RDW RBC AUTO: 44.1 FL (ref 37–54)
ERYTHROCYTE [DISTWIDTH] IN BLOOD BY AUTOMATED COUNT: 12.9 % (ref 11.3–14.5)
ESTRADIOL SERPL HS-MCNC: 24 PG/ML
FSH SERPL-ACNC: 108 MIU/ML
GFR SERPL CREATININE-BSD FRML MDRD: 61 ML/MIN/1.73
GLUCOSE BLD-MCNC: 89 MG/DL (ref 70–100)
HBA1C MFR BLD: 5.5 % (ref 4.8–5.6)
HCT VFR BLD AUTO: 43.4 % (ref 34.5–44)
HGB BLD-MCNC: 14.2 G/DL (ref 11.5–15.5)
MCH RBC QN AUTO: 30.5 PG (ref 27–31)
MCHC RBC AUTO-ENTMCNC: 32.7 G/DL (ref 32–36)
MCV RBC AUTO: 93.3 FL (ref 80–99)
PLATELET # BLD AUTO: 297 10*3/MM3 (ref 150–450)
PMV BLD AUTO: 10.2 FL (ref 6–12)
POTASSIUM BLD-SCNC: 3.7 MMOL/L (ref 3.5–5.5)
RBC # BLD AUTO: 4.65 10*6/MM3 (ref 3.89–5.14)
SODIUM BLD-SCNC: 141 MMOL/L (ref 132–146)
TESTOST SERPL-MCNC: 28.84 NG/DL (ref 0–813.86)
TSH SERPL DL<=0.05 MIU/L-ACNC: 4.21 MIU/ML (ref 0.35–5.35)
VIT B12 BLD-MCNC: 307 PG/ML (ref 211–911)
WBC NRBC COR # BLD: 5.75 10*3/MM3 (ref 3.5–10.8)

## 2018-08-17 PROCEDURE — 84443 ASSAY THYROID STIM HORMONE: CPT | Performed by: OBSTETRICS & GYNECOLOGY

## 2018-08-17 PROCEDURE — 83001 ASSAY OF GONADOTROPIN (FSH): CPT

## 2018-08-17 PROCEDURE — 82306 VITAMIN D 25 HYDROXY: CPT | Performed by: OBSTETRICS & GYNECOLOGY

## 2018-08-17 PROCEDURE — 80048 BASIC METABOLIC PNL TOTAL CA: CPT | Performed by: OBSTETRICS & GYNECOLOGY

## 2018-08-17 PROCEDURE — 83036 HEMOGLOBIN GLYCOSYLATED A1C: CPT | Performed by: OBSTETRICS & GYNECOLOGY

## 2018-08-17 PROCEDURE — 36415 COLL VENOUS BLD VENIPUNCTURE: CPT | Performed by: OBSTETRICS & GYNECOLOGY

## 2018-08-17 PROCEDURE — 85027 COMPLETE CBC AUTOMATED: CPT | Performed by: OBSTETRICS & GYNECOLOGY

## 2018-08-17 PROCEDURE — 84403 ASSAY OF TOTAL TESTOSTERONE: CPT

## 2018-08-17 PROCEDURE — 82607 VITAMIN B-12: CPT

## 2018-08-17 PROCEDURE — 82670 ASSAY OF TOTAL ESTRADIOL: CPT | Performed by: OBSTETRICS & GYNECOLOGY

## 2021-03-10 ENCOUNTER — IMMUNIZATION (OUTPATIENT)
Dept: VACCINE CLINIC | Facility: HOSPITAL | Age: 51
End: 2021-03-10

## 2021-03-10 PROCEDURE — 0001A: CPT | Performed by: OBSTETRICS & GYNECOLOGY

## 2021-03-10 PROCEDURE — 91300 HC SARSCOV02 VAC 30MCG/0.3ML IM: CPT | Performed by: OBSTETRICS & GYNECOLOGY

## 2021-03-31 ENCOUNTER — IMMUNIZATION (OUTPATIENT)
Dept: VACCINE CLINIC | Facility: HOSPITAL | Age: 51
End: 2021-03-31

## 2021-03-31 PROCEDURE — 91300 HC SARSCOV02 VAC 30MCG/0.3ML IM: CPT | Performed by: OBSTETRICS & GYNECOLOGY

## 2021-03-31 PROCEDURE — 0002A: CPT | Performed by: OBSTETRICS & GYNECOLOGY

## 2022-06-06 ENCOUNTER — TELEMEDICINE (OUTPATIENT)
Dept: FAMILY MEDICINE CLINIC | Facility: TELEHEALTH | Age: 52
End: 2022-06-06

## 2022-06-06 VITALS — BODY MASS INDEX: 31.28 KG/M2 | HEIGHT: 62 IN | TEMPERATURE: 97.7 F | WEIGHT: 170 LBS

## 2022-06-06 DIAGNOSIS — U07.1 COVID: Primary | ICD-10-CM

## 2022-06-06 DIAGNOSIS — R05.9 COUGH: ICD-10-CM

## 2022-06-06 DIAGNOSIS — R07.9 CHEST PAIN, UNSPECIFIED TYPE: ICD-10-CM

## 2022-06-06 PROCEDURE — 99213 OFFICE O/P EST LOW 20 MIN: CPT | Performed by: NURSE PRACTITIONER

## 2022-06-06 RX ORDER — AZITHROMYCIN 250 MG/1
TABLET, FILM COATED ORAL
Qty: 6 TABLET | Refills: 0 | Status: SHIPPED | OUTPATIENT
Start: 2022-06-06 | End: 2022-09-07

## 2022-06-06 RX ORDER — ALBUTEROL SULFATE 90 UG/1
2 AEROSOL, METERED RESPIRATORY (INHALATION) EVERY 4 HOURS PRN
Qty: 18 G | Refills: 0 | Status: SHIPPED | OUTPATIENT
Start: 2022-06-06 | End: 2022-09-07

## 2022-06-06 NOTE — PROGRESS NOTES
You have chosen to receive care through a telehealth visit.  Do you consent to use a video/audio connection for your medical care today? Yes     CHIEF COMPLAINT  Cc: COVID    HPI  Simi Royal is a 52 y.o. female  presents with complaint of COVID. She is concerned because she has had symptoms for at least 10 days now and her cough is not improving. She is also concerned as she has had some chest pain and her parents both had heart attacks around her age. She does reports some anxiety about this. She is not having chest pain at this time. Additional symptoms that the patient is having are noted in the ROS portion of this visit. The patient first tested for COVID on 05/27/2022 at home and the results of that test were negative. She tested again 05/28/2022 and the results of that test were positive. She had just traveled to [place]. She is up to date on her COVID vaccines via two doses of the Pfizer vaccine and a booster. She has taken an over the counter severe cough and cold medication for her symptoms.    Review of Systems   Constitutional: Positive for fatigue. Negative for fever.   HENT: Positive for congestion (yellow), postnasal drip, sinus pressure (minimal), sinus pain (minimal), sneezing and sore throat (intermittent). Negative for rhinorrhea and tinnitus.         No loss of taste and smell   Respiratory: Positive for cough and shortness of breath. Negative for wheezing. Chest tightness: improving.         Pain with inspiration   Cardiovascular: Positive for chest pain (moves, center, back, mostly mid back and across, gone now).   Gastrointestinal: Positive for diarrhea (intermittent). Negative for nausea.   Musculoskeletal: Joint swelling: resolved.   Neurological: Positive for headaches.   Psychiatric/Behavioral: The patient is nervous/anxious.        Past Medical History:   Diagnosis Date   • Cancer (HCC)     uterine cancer per recent biopsy    • History of anxiety        No family history on  "file.    Social History     Socioeconomic History   • Marital status:    • Number of children: 3   Tobacco Use   • Smoking status: Never Smoker   • Smokeless tobacco: Never Used   Substance and Sexual Activity   • Alcohol use: No     Comment: occasionally -\"a couple a month\"   • Drug use: No   • Sexual activity: Defer       Simi Royal  reports that she has never smoked. She has never used smokeless tobacco..     Temp 97.7 °F (36.5 °C)   Ht 157.5 cm (62\")   Wt 77.1 kg (170 lb)   BMI 31.09 kg/m²     PHYSICAL EXAM  Physical Exam   Constitutional: She is oriented to person, place, and time. She appears well-developed and well-nourished.   HENT:   Head: Normocephalic and atraumatic.   Right Ear: External ear normal.   Left Ear: External ear normal.   Nose: Congestion present. Right sinus exhibits maxillary sinus tenderness (mild, patient directed exam) and frontal sinus tenderness (mild, patient directed exam). Left sinus exhibits maxillary sinus tenderness (mild, patient directed exam) and frontal sinus tenderness (mild, patient directed exam).   Mouth/Throat: Mouth/Lips are normal.  Eyes: Lids are normal. Right eye exhibits no discharge and no exudate. Left eye exhibits no discharge and no exudate. Right conjunctiva is not injected. Left conjunctiva is not injected.   Pulmonary/Chest: No accessory muscle usage. No tachypnea and no bradypnea.  No respiratory distress (persistent, dry cough at visit).No use of oxygen by nasal cannulaNo use of oxygen by mask noted.  Abdominal: Abdomen appears normal.   Neurological: She is alert and oriented to person, place, and time. No cranial nerve deficit.   Skin: Her skin appears normal.  Psychiatric: Her speech is normal and behavior is normal. Judgment and thought content normal. She mood appears anxious (reported by patient).       Results for orders placed or performed in visit on 08/17/18   Vitamin B12    Specimen: Blood   Result Value Ref Range    Vitamin " B-12 307 211 - 911 pg/mL   Testosterone    Specimen: Blood   Result Value Ref Range    Testosterone, Total 28.84 0.00 - 813.86 ng/dL   Follicle Stimulating Hormone    Specimen: Blood   Result Value Ref Range    .00 mIU/mL       Diagnoses and all orders for this visit:    1. COVID (Primary)  -     Ambulatory Referral to Internal Medicine    2. Cough  -     Ambulatory Referral to Internal Medicine    3. Chest pain, unspecified type  -     Ambulatory Referral to Internal Medicine    Other orders  -     azithromycin (Zithromax) 250 MG tablet; Take 2 tablets the first day, then 1 tablet daily for 4 days.  Dispense: 6 tablet; Refill: 0  -     albuterol sulfate  (90 Base) MCG/ACT inhaler; Inhale 2 puffs Every 4 (Four) Hours As Needed for Wheezing.  Dispense: 18 g; Refill: 0      Probiotics for two weeks related to taking antibiotics. The pharmacist can help you with this if needed. Do not take within two hours of antibiotic.  Albuterol inhaler as needed    FOLLOW-UP  If chest pain returns proceed to ER     If symptoms worsen or persist follow up with PCP. Virtual Care or Urgent Care    A referral for a primary care provide has been put in for you, make an appointment as soon as possible    Patient verbalizes understanding of medication dosage, comfort measures, instructions for treatment and follow-up.    JOHN Tobar  06/06/2022  10:03 EDT    The use of a video visit has been reviewed with the patient and verbal informed consent has been obtained. Myself and Simi Royal participated in this visit. The patient is located in [address] [place] [address].    I am located in [place], [place]. Mychart and Zoom were utilized. I spent 25 minutes in the patient's chart for this visit.         Previously Declined (within the last year)

## 2022-09-07 ENCOUNTER — PATIENT ROUNDING (BHMG ONLY) (OUTPATIENT)
Dept: INTERNAL MEDICINE | Facility: CLINIC | Age: 52
End: 2022-09-07

## 2022-09-07 ENCOUNTER — OFFICE VISIT (OUTPATIENT)
Dept: INTERNAL MEDICINE | Facility: CLINIC | Age: 52
End: 2022-09-07

## 2022-09-07 VITALS
TEMPERATURE: 98 F | BODY MASS INDEX: 30.1 KG/M2 | SYSTOLIC BLOOD PRESSURE: 130 MMHG | WEIGHT: 163.58 LBS | HEIGHT: 62 IN | OXYGEN SATURATION: 100 % | HEART RATE: 71 BPM | DIASTOLIC BLOOD PRESSURE: 82 MMHG

## 2022-09-07 DIAGNOSIS — D06.9 ADENOCARCINOMA IN SITU (AIS) OF UTERINE CERVIX: ICD-10-CM

## 2022-09-07 DIAGNOSIS — E28.39 HYPOESTROGENISM: ICD-10-CM

## 2022-09-07 DIAGNOSIS — F33.1 MAJOR DEPRESSIVE DISORDER, RECURRENT EPISODE, MODERATE DEGREE: ICD-10-CM

## 2022-09-07 DIAGNOSIS — M25.562 ARTHRALGIA OF BOTH KNEES: ICD-10-CM

## 2022-09-07 DIAGNOSIS — R53.82 CHRONIC FATIGUE: Primary | ICD-10-CM

## 2022-09-07 DIAGNOSIS — R79.89 LOW VITAMIN D LEVEL: ICD-10-CM

## 2022-09-07 DIAGNOSIS — Z76.89 ENCOUNTER TO ESTABLISH CARE: ICD-10-CM

## 2022-09-07 DIAGNOSIS — R63.5 WEIGHT GAIN: ICD-10-CM

## 2022-09-07 DIAGNOSIS — M25.561 ARTHRALGIA OF BOTH KNEES: ICD-10-CM

## 2022-09-07 PROBLEM — F41.9 ANXIETY: Status: RESOLVED | Noted: 2018-03-28 | Resolved: 2022-09-07

## 2022-09-07 PROCEDURE — 99214 OFFICE O/P EST MOD 30 MIN: CPT | Performed by: NURSE PRACTITIONER

## 2022-09-07 RX ORDER — VILAZODONE HYDROCHLORIDE 20 MG/1
20 TABLET ORAL NIGHTLY
Qty: 30 TABLET | Refills: 6 | Status: SHIPPED | OUTPATIENT
Start: 2022-09-07 | End: 2022-09-12

## 2022-09-07 NOTE — ASSESSMENT & PLAN NOTE
Discussed with patient about treatment options.  Patient is going to reach out to her insurance to find therapist options.  Discussed about medications.  No thoughts of self-harm.  Answered some questions from the patient.  We will continue to monitor closely.

## 2022-09-07 NOTE — ASSESSMENT & PLAN NOTE
Discussed with patient about condition.  Discussed about lab testing.  We will continue to monitor.   GERD (gastroesophageal reflux disease) Enlarged prostate

## 2022-09-07 NOTE — PROGRESS NOTES
Date of Encounter: 2022  Patient: Simi Royal,  1970    Subjective     Chief complaint: Hormones, fatigue, joint pain, establish care    HPI   Patient here today establishing care.  Patient states that its been several years since she has followed with a PCP.  Patient last followed with her OB/GYN.  States that her last visit was likely around 2018.    Patient had a total hysterectomy in 2018.  Patient did a trial oral hormone replacement for about 6 months.  States that she could could not sleep and had other issues from the medication.  Patient has not followed up with her OB/GYN since that time.  States that she would like a referral to go back to her previous OB/GYN.      Patient would also like to discuss about joint pain.  States it is gotten worse over the last 2 years.  States it is bilateral and mostly affects her elbows and knees.  Patient states it is worse in the morning and gets better after she has been moving.  States that she has an aunt with rheumatoid arthritis and had some concerns that she may have the same thing.     Patient also like to discuss about weight gain.  States that after she had her hysterectomy she gained about 30 pounds and has not been able to keep it off.    Patient also has problems with fatigue.  States that she feels tired all the time.  Is not motivated to try any things or be social.    Patient states that she struggled with depression in the past.  Has tried several medications.  States that she tried Viibryd and it did well.  Is open to restarting that medication today.      Review of Systems:  Negative for fever, congestion, chest pain upon exertion, shortness of breath, vision changes, vomiting, dysuria, lymphadenopathy, muscle weakness, numbness, rashes.    The following portions of the patient's history were reviewed and updated as appropriate: allergies, current medications, past family history, past medical history, past social history, past  "surgical history and problem list.    Patient Active Problem List   Diagnosis   • Dysplasia of cervix, high grade MIKE 2   • Adenocarcinoma in situ (AIS) of uterine cervix   • S/P LEEP   • Adenocarcinoma in situ of cervix   • Hypoestrogenism   • Sleep disturbance   • Arthralgia of both knees   • Weight gain   • Major depressive disorder, recurrent episode, moderate degree (HCC)     Past Medical History:   Diagnosis Date   • Anxiety 3/28/2018   • Cancer (HCC)     uterine cancer per recent biopsy    • History of anxiety      Past Surgical History:   Procedure Laterality Date   • APPENDECTOMY     • CHOLECYSTECTOMY     • ENDOMETRIAL ABLATION     • HYSTEROSCOPY  12/2017    biopsy    • LAPAROSCOPIC ASSISTED VAGINAL HYSTERECTOMY SALPINGO OOPHORECTOMY N/A 1/17/2018    Procedure: LAPAROSCOPIC ASSISTED VAGINAL HYSTERECTOMY BILATERAL SALPINGO OOPHORECTOMY;  Surgeon: Kellen David MD;  Location: [facility];  Service:    • TUBAL ABDOMINAL LIGATION       No family history on file.    Current Outpatient Medications:   •  albuterol sulfate  (90 Base) MCG/ACT inhaler, Inhale 2 puffs Every 4 (Four) Hours As Needed for Wheezing., Disp: 18 g, Rfl: 0  •  azithromycin (Zithromax) 250 MG tablet, Take 2 tablets the first day, then 1 tablet daily for 4 days., Disp: 6 tablet, Rfl: 0  •  vilazodone (Viibryd) 20 MG tablet tablet, Take 1 tablet by mouth Every Night. Take 1/2 tab for 1 week, then increase to 1 tab nightly., Disp: 30 tablet, Rfl: 6  No Known Allergies  Social History     Tobacco Use   • Smoking status: Never Smoker   • Smokeless tobacco: Never Used   Substance Use Topics   • Alcohol use: No     Comment: occasionally -\"a couple a month\"   • Drug use: No          Objective   Physical Exam   Vitals:    09/07/22 0826   BP: 130/82   Pulse: 71   Temp: 98 °F (36.7 °C)   TempSrc: Temporal   SpO2: 100%   Weight: 74.2 kg (163 lb 9.3 oz)   Height: 157.5 cm (62\")     Body mass index is 29.92 kg/m².    Constitutional: " NAD.  Cardiovascular: RRR.   Psychiatric: Normal affect. Normal thought content.  Tearful during parts of exam.  Musculoskeletal: Patient has full range of motion of all great joints.  No swelling, or redness noted.         Assessment & Plan   Assessment and Plan  Pleasant 52-year-old female with major depressive disorder, sleep disturbance, arthralgias of both knees, weight gain and others here today establishing care.  The following was discussed:    Diagnoses and all orders for this visit:    1. Chronic fatigue (Primary)  -     Hemoglobin A1c  -     CBC & Differential  -     Comprehensive Metabolic Panel  -     Rheumatoid Factor  -     Thyroid Panel With TSH  -     Vitamin D 25 Hydroxy  -     CRYSTAL by IFA, Reflex 9-biomarkers profile  -     Ambulatory Referral to Obstetrics / Gynecology    2. Arthralgia of both knees  Overview:  Family history of rheumatoid arthritis.    Assessment & Plan:  Discussed with patient about condition.  Discussed about lab testing.  We will continue to monitor.    Orders:  -     Rheumatoid Factor  -     CRYSTAL by IFA, Reflex 9-biomarkers profile    3. Weight gain  Assessment & Plan:  Will discuss weight loss options with patient at next visit.    Orders:  -     Hemoglobin A1c  -     Thyroid Panel With TSH  -     Ambulatory Referral to Obstetrics / Gynecology    4. Low vitamin D level  -     Vitamin D 25 Hydroxy    5. Hypoestrogenism  Overview:  Patient has taken oral hormone replacement in the past.  Did not like side effects.  Discussed with patient about following up with her OB/GYN for further treatment options.    Orders:  -     Ambulatory Referral to Obstetrics / Gynecology    6. Adenocarcinoma in situ (AIS) of uterine cervix  -     Ambulatory Referral to Obstetrics / Gynecology    7. Major depressive disorder, recurrent episode, moderate degree (HCC)  Assessment & Plan:  Discussed with patient about treatment options.  Patient is going to reach out to her insurance to find therapist  options.  Discussed about medications.  No thoughts of self-harm.  Answered some questions from the patient.  We will continue to monitor closely.    Orders:  -     vilazodone (Viibryd) 20 MG tablet tablet; Take 1 tablet by mouth Every Night. Take 1/2 tab for 1 week, then increase to 1 tab nightly.  Dispense: 30 tablet; Refill: 6    8. Encounter to establish care    Welcomed patient to practice. Discussed office policies. Reviewed patient reported medical history at bedside.    Patient verbalized understanding of and agreed to plan of care.    Return in about 6 years (around 9/7/2028) for Annual physical, Fasting Labs.     Marisol Archer DNP, FNP-C  Family Medicine  O: [phone]      Please note that portions of this note were completed with a voice recognition program. Please call if questions.

## 2022-09-07 NOTE — ASSESSMENT & PLAN NOTE
Patient:   CHRISTINA MARTINO            MRN: [id number]            FIN: [id number]               Age:   68 years     Sex:  FEMALE     :  49   Associated Diagnoses:   None   Author:   STEVEN LANDA      Date of admission: 2017  Date of discharge: 17  Disposition-home    Hospital course:   This is a 68 years old female with history of ovarian cancer in remission, chronic esophageal reflux disease with large hiatal hernia comes in with abdominal pain in the epigastric region    Acute pancreatitis  No known gallbladder disease, LFTs normal, but patient does admit to having postprandial pain on and off with heavy meals  No history of alcohol and no common medications which can trigger, TGL Nl  Sound of the gallbladder checked and it was fine, no stones no cholecystitis.  No further symptoms  Advance diet and tolerating      Hiatal hernia with GERD  Had a endoscopy 6 months ago, symptoms controlled with Tums and Pepcid.  Resume pepcid   FU with  as prn    Depression  Celexa    Ovarian cancer status post remission  Follows with the Dr. Osuna    Given acute pancreatitis patient needed  2 midnight stay and hence inpatient    PCP Dr. Cochran.         Discharge diagnoses:   Acute mild pancreatitis    Vitals:   Vitals between:   10-AUG-2017 16:04:07   TO   11-AUG-2017 16:04:07                   LAST RESULT MINIMUM MAXIMUM  Temperature 36.6 36.2 36.7  Heart Rate 80 72 82  Respiratory Rate 12 12 17  NISBP           126 126 148  NIDBP           76 71 84  NIMBP           93 93 93  SpO2                    98 97 100    Labs:   Labs between:  10-AUG-2017 16:04 to 11-AUG-2017 16:04    BMP:                 Na  Cl  BUN  Glu   11-AUG-2017 144  (H) 108  7  83                              K  CO2  Cr  Ca                              3.5  26  0.76  8.4     CMP:                 AST  ALT  AlkPhos  Bili  Albumin   11-AUG-2017 16  22  65  0.5  (L) 3.3     Patient is awake alert in no distress.  Head  Will discuss weight loss options with patient at next visit.   appears normocephalic  Eyes: Normal conjunctiva,  EOMs intact  Oral mucosa moist  Neck supple  Cardiovascular-S1-S2 present no murmurs  Pulmonary-Bilateral normal BS, no wheezing or rhonchi . Equal and non labored  Gastrointestinal-soft nontender, no organomegaly  Musculoskeletal-No edema. No calf tenderness  Neurological-intact. No focal deficit. Normal motor and sensory  Skin -no rash  Psych-normal affect                 Radiology results:      IMPRESSION:  1.  No cholelithiasis or sonographic evidence of acute cholecystitis.  2.  No biliary ductal dilation.  3.  No peripancreatic fluid collection is seen.    IMPRESSION:  1.  Slight peripancreatic stranding which may represent acute pancreatitis.  Clinical and laboratory correlation.  2.  Large hiatal hernia.  3.  Colonic diverticulosis.        Echocardiogram Results    No Results Have Been Found    Pending results:      DISCHARGE MEDICATION LIST   Allergies: No known allergies     MEDICATION  DOSE  ROUTE  FREQUENCY  SPECIAL INSTRUCTIONS   citalopram (CeleXA oral 10 mg tablet)  10 mg=1 tab  Oral  Daily     famotidine (Pepcid oral 40 mg tablet)  40 mg=1 tab  Oral  Twice daily         Primary Care Physician      Physician Name:  SACHI TESFAYE  Specialty :  INTERNAL MEDICINE    No Consulting Physicians.    Follow-up instructions: PCP in one week   PCP notified      I spent 35 minutes completing this patient's discharge.                  Electronically Signed On 08/11/2017 17:55  __________________________________________________   STEVEN LANDA      Electronically Signed On 08/11/2017 18:04  __________________________________________________   STEVEN LANDA

## 2022-09-08 ENCOUNTER — TELEPHONE (OUTPATIENT)
Dept: INTERNAL MEDICINE | Facility: CLINIC | Age: 52
End: 2022-09-08

## 2022-09-08 LAB
25(OH)D3+25(OH)D2 SERPL-MCNC: 30.6 NG/ML (ref 30–100)
ALBUMIN SERPL-MCNC: 4.7 G/DL (ref 3.8–4.9)
ALBUMIN/GLOB SERPL: 2.2 {RATIO} (ref 1.2–2.2)
ALP SERPL-CCNC: 75 IU/L (ref 44–121)
ALT SERPL-CCNC: 17 IU/L (ref 0–32)
ANA SER QL IF: NEGATIVE
AST SERPL-CCNC: 15 IU/L (ref 0–40)
BASOPHILS # BLD AUTO: 0.1 X10E3/UL (ref 0–0.2)
BASOPHILS NFR BLD AUTO: 2 %
BILIRUB SERPL-MCNC: 0.6 MG/DL (ref 0–1.2)
BUN SERPL-MCNC: 12 MG/DL (ref 6–24)
BUN/CREAT SERPL: 11 (ref 9–23)
CALCIUM SERPL-MCNC: 9.5 MG/DL (ref 8.7–10.2)
CHLORIDE SERPL-SCNC: 105 MMOL/L (ref 96–106)
CO2 SERPL-SCNC: 23 MMOL/L (ref 20–29)
CREAT SERPL-MCNC: 1.09 MG/DL (ref 0.57–1)
EGFRCR-CYS SERPLBLD CKD-EPI 2021: 61 ML/MIN/1.73
EOSINOPHIL # BLD AUTO: 0.5 X10E3/UL (ref 0–0.4)
EOSINOPHIL NFR BLD AUTO: 10 %
ERYTHROCYTE [DISTWIDTH] IN BLOOD BY AUTOMATED COUNT: 12.3 % (ref 11.7–15.4)
FT4I SERPL CALC-MCNC: 2.3 (ref 1.2–4.9)
GLOBULIN SER CALC-MCNC: 2.1 G/DL (ref 1.5–4.5)
GLUCOSE SERPL-MCNC: 89 MG/DL (ref 65–99)
HBA1C MFR BLD: 5.6 % (ref 4.8–5.6)
HCT VFR BLD AUTO: 44.6 % (ref 34–46.6)
HGB BLD-MCNC: 14.7 G/DL (ref 11.1–15.9)
IMM GRANULOCYTES # BLD AUTO: 0 X10E3/UL (ref 0–0.1)
IMM GRANULOCYTES NFR BLD AUTO: 0 %
LABORATORY COMMENT REPORT: NORMAL
LYMPHOCYTES # BLD AUTO: 2.1 X10E3/UL (ref 0.7–3.1)
LYMPHOCYTES NFR BLD AUTO: 40 %
MCH RBC QN AUTO: 30.2 PG (ref 26.6–33)
MCHC RBC AUTO-ENTMCNC: 33 G/DL (ref 31.5–35.7)
MCV RBC AUTO: 92 FL (ref 79–97)
MONOCYTES # BLD AUTO: 0.4 X10E3/UL (ref 0.1–0.9)
MONOCYTES NFR BLD AUTO: 7 %
NEUTROPHILS # BLD AUTO: 2.2 X10E3/UL (ref 1.4–7)
NEUTROPHILS NFR BLD AUTO: 41 %
PLATELET # BLD AUTO: 301 X10E3/UL (ref 150–450)
POTASSIUM SERPL-SCNC: 4.3 MMOL/L (ref 3.5–5.2)
PROT SERPL-MCNC: 6.8 G/DL (ref 6–8.5)
RBC # BLD AUTO: 4.87 X10E6/UL (ref 3.77–5.28)
RHEUMATOID FACT SERPL-ACNC: <10 IU/ML
SODIUM SERPL-SCNC: 142 MMOL/L (ref 134–144)
T3RU NFR SERPL: 29 % (ref 24–39)
T4 SERPL-MCNC: 7.8 UG/DL (ref 4.5–12)
TSH SERPL DL<=0.005 MIU/L-ACNC: 5.75 UIU/ML (ref 0.45–4.5)
WBC # BLD AUTO: 5.3 X10E3/UL (ref 3.4–10.8)

## 2022-09-08 NOTE — TELEPHONE ENCOUNTER
Spoke to patient, I tried to give her a goodrx coupon but the price didn't change from what her insurance was going to pay.     Please advise,     Thanks!

## 2022-09-08 NOTE — TELEPHONE ENCOUNTER
Caller: Simi Royal    Relationship: Self    Best call back number: [phone]      What medications are you currently taking:   Current Outpatient Medications on File Prior to Visit   Medication Sig Dispense Refill   • vilazodone (Viibryd) 20 MG tablet tablet Take 1 tablet by mouth Every Night. Take 1/2 tab for 1 week, then increase to 1 tab nightly. 30 tablet 6     No current facility-administered medications on file prior to visit.          When did you start taking these medications: HASNT STARTED     Which medication are you concerned about: VILAZODONE     Who prescribed you this medication: CHANDRIKA     What are your concerns: PATIENT STATES HER MEDICATION IS TOO EXPENSIVE, SHE WAS WONDERING IF SHE HAD ANOTHER OPTION.

## 2022-09-09 NOTE — TELEPHONE ENCOUNTER
Spoke to pt about providers message, pt states that she doesn't still have the [employer] testing results and that she will have to find them.     Pt states that she also had reviewed her labs via [employer] and was concerned about some of the values. She states she searched on the internet some of the values and that she may not need the mood medication if some of these values improve.    Pt was advised that provider has not resulted these at this time, and therefore I can not elaborate on what Marisol would like to do. Pt was advised that when provider has resulted these she will be called.    Please advise.

## 2022-09-12 ENCOUNTER — TELEPHONE (OUTPATIENT)
Dept: INTERNAL MEDICINE | Facility: CLINIC | Age: 52
End: 2022-09-12

## 2022-09-12 NOTE — TELEPHONE ENCOUNTER
Spoke to pt, pt understands; no questions about lab results. Pt did state that provider had offered samples, for after blood work was resulted. Pt is inquiring about those now, One for weight loss and one that was prescribed that was too expensive.    Please advise.

## 2022-09-12 NOTE — TELEPHONE ENCOUNTER
----- Message from JOHN Gooden sent at 9/12/2022  9:12 AM EDT -----  Please call the patient to update them regarding their results.

## 2023-04-03 ENCOUNTER — PATIENT ROUNDING (BHMG ONLY) (OUTPATIENT)
Dept: FAMILY MEDICINE CLINIC | Facility: CLINIC | Age: 53
End: 2023-04-03

## 2023-04-03 ENCOUNTER — OFFICE VISIT (OUTPATIENT)
Dept: FAMILY MEDICINE CLINIC | Facility: CLINIC | Age: 53
End: 2023-04-03
Payer: COMMERCIAL

## 2023-04-03 VITALS
HEIGHT: 62 IN | RESPIRATION RATE: 16 BRPM | HEART RATE: 64 BPM | BODY MASS INDEX: 31.98 KG/M2 | SYSTOLIC BLOOD PRESSURE: 125 MMHG | TEMPERATURE: 97.6 F | OXYGEN SATURATION: 97 % | DIASTOLIC BLOOD PRESSURE: 80 MMHG | WEIGHT: 173.8 LBS

## 2023-04-03 DIAGNOSIS — E55.9 VITAMIN D DEFICIENCY: ICD-10-CM

## 2023-04-03 DIAGNOSIS — R53.83 OTHER FATIGUE: ICD-10-CM

## 2023-04-03 DIAGNOSIS — Z00.00 ENCOUNTER FOR MEDICAL EXAMINATION TO ESTABLISH CARE: Primary | ICD-10-CM

## 2023-04-03 DIAGNOSIS — E78.2 MIXED HYPERLIPIDEMIA: ICD-10-CM

## 2023-04-03 NOTE — PROGRESS NOTES
"Chief Complaint  Establish Care    Subjective        Simi Royal presents to [facility]  History of Present Illness  For establishing medical care and following chief complaints and positive medical history  Elbow, wrist, knee joint pain  Hx of hysterectomy in 2018, was on HRT for a month, discontinued as she was not getting help from it., had weight gain, gained 30-40lbs since then  Hx of gestational diabetes        Objective   Vital Signs:  /80   Pulse 64   Temp 97.6 °F (36.4 °C)   Resp 16   Ht 157.5 cm (62.01\")   Wt 78.8 kg (173 lb 12.8 oz)   SpO2 97%   BMI 31.78 kg/m²   Estimated body mass index is 31.78 kg/m² as calculated from the following:    Height as of this encounter: 157.5 cm (62.01\").    Weight as of this encounter: 78.8 kg (173 lb 12.8 oz).             Physical Exam  HENT:      Head: Normocephalic and atraumatic.      Mouth/Throat:      Mouth: Mucous membranes are moist.      Pharynx: Oropharynx is clear.   Eyes:      Extraocular Movements: Extraocular movements intact.      Conjunctiva/sclera: Conjunctivae normal.      Pupils: Pupils are equal, round, and reactive to light.   Cardiovascular:      Rate and Rhythm: Normal rate and regular rhythm.   Pulmonary:      Effort: Pulmonary effort is normal.      Breath sounds: Normal breath sounds.   Abdominal:      General: Bowel sounds are normal.      Palpations: Abdomen is soft.   Musculoskeletal:         General: Normal range of motion.      Cervical back: Neck supple.   Skin:     General: Skin is warm.      Capillary Refill: Capillary refill takes less than 2 seconds.   Neurological:      General: No focal deficit present.      Mental Status: She is alert and oriented to person, place, and time. Mental status is at baseline.   Psychiatric:         Mood and Affect: Mood normal.        Result Review :  The following data was reviewed by: Marvin Dang MD on 04/03/2023:  CMP    CMP 9/7/22 4/4/23 "   Glucose 89 92   BUN 12 15   Creatinine 1.09 (A) 1.03 (A)   Sodium 142 141   Potassium 4.3 5.0   Chloride 105 106   Calcium 9.5 9.4   Total Protein 6.8 6.9   Albumin 4.7 4.4   Globulin 2.1 2.5   Total Bilirubin 0.6 0.3   Alkaline Phosphatase 75 72   AST (SGOT) 15 30   ALT (SGPT) 17 31   BUN/Creatinine Ratio 11 15   (A) Abnormal value            CBC    CBC 9/7/22 4/4/23   WBC 5.3 4.5   RBC 4.87 4.70   Hemoglobin 14.7 14.1   Hematocrit 44.6 43.3   MCV 92 92   MCH 30.2 30.0   MCHC 33.0 32.6   RDW 12.3 12.0   Platelets 301 264           Lipid Panel    Lipid Panel 4/4/23   Total Cholesterol 228 (A)   Triglycerides 69   HDL Cholesterol 56   VLDL Cholesterol 12   LDL Cholesterol  160 (A)   (A) Abnormal value            TSH    TSH 9/7/22 4/4/23   TSH 5.750 (A) 4.310   (A) Abnormal value                         Assessment and Plan   Diagnoses and all orders for this visit:    1. Encounter for medical examination to establish care (Primary)    2. Mixed hyperlipidemia  -     CBC Auto Differential  -     Comprehensive Metabolic Panel  -     Lipid Panel With / Chol / HDL Ratio  -     TSH  -     Hemoglobin A1c  -     Vitamin D,25-Hydroxy  -     Vitamin B12  -     Folate    3. Other fatigue  -     CBC Auto Differential  -     Comprehensive Metabolic Panel  -     Lipid Panel With / Chol / HDL Ratio  -     TSH  -     Hemoglobin A1c  -     Vitamin D,25-Hydroxy  -     Vitamin B12  -     Folate    4. Vitamin D deficiency  -     vitamin D (ERGOCALCIFEROL) 1.25 MG (79226 UT) capsule capsule; Take 1 capsule by mouth 1 (One) Time Per Week.  Dispense: 12 capsule; Refill: 1    Other orders  -     CBC & Differential            vit d def  Vit d level ~13  Prescribed ergocalciferol 22846FG weekly    Hyperlipidemia  Lipid profile trended up  Advised patient to eat more fruits and vegetable in the diet, avoid yellow portion of the egg and less red meat consumption and will repeat lipid panel in 6 months    RTC in 6 months for physical    Follow  Up   No follow-ups on file.  Patient was given instructions and counseling regarding her condition or for health maintenance advice. Please see specific information pulled into the AVS if appropriate.

## 2023-04-03 NOTE — PROGRESS NOTES
April 3, 2023      Surya Royal,     I want to officially welcome you to our practice and ask about your recent visit.     Overall were you satisfied with your visit?     YES  Would you recommend our office to friends and family?   YES    Your experience is very important to us.  You may receive an email regarding a survey.  Please take a moment to complete.  This will help us to improve.      I appreciate you taking the time to answer these questions.       Thank you and have a great day.

## 2023-04-05 LAB
25(OH)D3+25(OH)D2 SERPL-MCNC: 13.7 NG/ML (ref 30–100)
ALBUMIN SERPL-MCNC: 4.4 G/DL (ref 3.8–4.9)
ALBUMIN/GLOB SERPL: 1.8 {RATIO} (ref 1.2–2.2)
ALP SERPL-CCNC: 72 IU/L (ref 44–121)
ALT SERPL-CCNC: 31 IU/L (ref 0–32)
AST SERPL-CCNC: 30 IU/L (ref 0–40)
BASOPHILS # BLD AUTO: 0.1 X10E3/UL (ref 0–0.2)
BASOPHILS NFR BLD AUTO: 2 %
BILIRUB SERPL-MCNC: 0.3 MG/DL (ref 0–1.2)
BUN SERPL-MCNC: 15 MG/DL (ref 6–24)
BUN/CREAT SERPL: 15 (ref 9–23)
CALCIUM SERPL-MCNC: 9.4 MG/DL (ref 8.7–10.2)
CHLORIDE SERPL-SCNC: 106 MMOL/L (ref 96–106)
CHOLEST SERPL-MCNC: 228 MG/DL (ref 100–199)
CHOLEST/HDLC SERPL: 4.1 RATIO (ref 0–4.4)
CO2 SERPL-SCNC: 24 MMOL/L (ref 20–29)
CREAT SERPL-MCNC: 1.03 MG/DL (ref 0.57–1)
EGFRCR SERPLBLD CKD-EPI 2021: 65 ML/MIN/1.73
EOSINOPHIL # BLD AUTO: 0.5 X10E3/UL (ref 0–0.4)
EOSINOPHIL NFR BLD AUTO: 11 %
ERYTHROCYTE [DISTWIDTH] IN BLOOD BY AUTOMATED COUNT: 12 % (ref 11.7–15.4)
FOLATE SERPL-MCNC: 19.1 NG/ML
GLOBULIN SER CALC-MCNC: 2.5 G/DL (ref 1.5–4.5)
GLUCOSE SERPL-MCNC: 92 MG/DL (ref 70–99)
HBA1C MFR BLD: 5.6 % (ref 4.8–5.6)
HCT VFR BLD AUTO: 43.3 % (ref 34–46.6)
HDLC SERPL-MCNC: 56 MG/DL
HGB BLD-MCNC: 14.1 G/DL (ref 11.1–15.9)
IMM GRANULOCYTES # BLD AUTO: 0 X10E3/UL (ref 0–0.1)
IMM GRANULOCYTES NFR BLD AUTO: 0 %
LDLC SERPL CALC-MCNC: 160 MG/DL (ref 0–99)
LYMPHOCYTES # BLD AUTO: 1.8 X10E3/UL (ref 0.7–3.1)
LYMPHOCYTES NFR BLD AUTO: 41 %
MCH RBC QN AUTO: 30 PG (ref 26.6–33)
MCHC RBC AUTO-ENTMCNC: 32.6 G/DL (ref 31.5–35.7)
MCV RBC AUTO: 92 FL (ref 79–97)
MONOCYTES # BLD AUTO: 0.4 X10E3/UL (ref 0.1–0.9)
MONOCYTES NFR BLD AUTO: 8 %
NEUTROPHILS # BLD AUTO: 1.7 X10E3/UL (ref 1.4–7)
NEUTROPHILS NFR BLD AUTO: 38 %
PLATELET # BLD AUTO: 264 X10E3/UL (ref 150–450)
POTASSIUM SERPL-SCNC: 5 MMOL/L (ref 3.5–5.2)
PROT SERPL-MCNC: 6.9 G/DL (ref 6–8.5)
RBC # BLD AUTO: 4.7 X10E6/UL (ref 3.77–5.28)
SODIUM SERPL-SCNC: 141 MMOL/L (ref 134–144)
TRIGL SERPL-MCNC: 69 MG/DL (ref 0–149)
TSH SERPL DL<=0.005 MIU/L-ACNC: 4.31 UIU/ML (ref 0.45–4.5)
VIT B12 SERPL-MCNC: 525 PG/ML (ref 232–1245)
VLDLC SERPL CALC-MCNC: 12 MG/DL (ref 5–40)
WBC # BLD AUTO: 4.5 X10E3/UL (ref 3.4–10.8)

## 2023-04-05 RX ORDER — ERGOCALCIFEROL 1.25 MG/1
50000 CAPSULE ORAL WEEKLY
Qty: 12 CAPSULE | Refills: 1 | Status: SHIPPED | OUTPATIENT
Start: 2023-04-05

## 2023-04-07 ENCOUNTER — TELEPHONE (OUTPATIENT)
Dept: FAMILY MEDICINE CLINIC | Facility: CLINIC | Age: 53
End: 2023-04-07

## 2023-04-07 DIAGNOSIS — E66.9 CLASS 1 OBESITY WITHOUT SERIOUS COMORBIDITY WITH BODY MASS INDEX (BMI) OF 31.0 TO 31.9 IN ADULT, UNSPECIFIED OBESITY TYPE: Primary | ICD-10-CM

## 2023-04-07 NOTE — TELEPHONE ENCOUNTER
Caller: Simi Royal    Relationship: Self    Best call back number: [phone]    What is the best time to reach you: ANY TIME    Who are you requesting to speak with (clinical staff, provider,  specific staff member): CLINICAL STAFF    What was the call regarding: PATIENT SAYS THAT SHE WANTS TO LET DR. REDDING KNOW THAT SHE DID  HER PRESCRIPTION FOR VITAMIN D AND SHE WOULD LIKE TO KNOW THE STATUS OF A PRESCRIPTION FOR  SEMAGLUTIDE.      Do you require a callback: YES    PLEASE ADVISE.

## 2023-04-20 DIAGNOSIS — E66.9 CLASS 1 OBESITY WITHOUT SERIOUS COMORBIDITY WITH BODY MASS INDEX (BMI) OF 31.0 TO 31.9 IN ADULT, UNSPECIFIED OBESITY TYPE: Primary | ICD-10-CM

## 2023-04-20 RX ORDER — PHENTERMINE HYDROCHLORIDE 37.5 MG/1
37.5 CAPSULE ORAL EVERY MORNING
Qty: 30 CAPSULE | Refills: 0 | Status: SHIPPED | OUTPATIENT
Start: 2023-04-20

## 2023-04-20 NOTE — TELEPHONE ENCOUNTER
HUB ATTEMPTED TO WARM TRANSFER AND WAS UNSUCCESSFUL    Caller: Simi Royal    Relationship: Self    Best call back number: [phone]    What is the best time to reach you: ANY    Who are you requesting to speak with (clinical staff, provider,  specific staff member): DR. REDDING OR MA    What was the call regarding: PATIENT STATES THE SEMAGLUTIDE IS NEEDING A PRIOR AUTHORIZATION AND WAS CALLING TO CHECK STATUS OF IT.     Do you require a callback: YES

## 2023-04-20 NOTE — TELEPHONE ENCOUNTER
HUB TO READ:  called patient no answer- This medication was discontinued by Dr Dang today 4/20/23 at 9 this morning. New RX was sent in for phentermine.

## 2023-05-09 ENCOUNTER — OFFICE VISIT (OUTPATIENT)
Dept: FAMILY MEDICINE CLINIC | Facility: CLINIC | Age: 53
End: 2023-05-09
Payer: COMMERCIAL

## 2023-05-09 ENCOUNTER — TELEPHONE (OUTPATIENT)
Dept: FAMILY MEDICINE CLINIC | Facility: CLINIC | Age: 53
End: 2023-05-09

## 2023-05-09 VITALS
SYSTOLIC BLOOD PRESSURE: 129 MMHG | HEART RATE: 80 BPM | HEIGHT: 62 IN | TEMPERATURE: 97.3 F | OXYGEN SATURATION: 94 % | RESPIRATION RATE: 16 BRPM | DIASTOLIC BLOOD PRESSURE: 84 MMHG | WEIGHT: 170.4 LBS | BODY MASS INDEX: 31.36 KG/M2

## 2023-05-09 DIAGNOSIS — R19.7 DIARRHEA, UNSPECIFIED TYPE: ICD-10-CM

## 2023-05-09 DIAGNOSIS — K52.9 GASTROENTERITIS: Primary | ICD-10-CM

## 2023-05-09 DIAGNOSIS — R11.2 NAUSEA AND VOMITING, UNSPECIFIED VOMITING TYPE: ICD-10-CM

## 2023-05-09 DIAGNOSIS — R05.9 COUGH IN ADULT: ICD-10-CM

## 2023-05-09 DIAGNOSIS — R53.83 OTHER FATIGUE: ICD-10-CM

## 2023-05-09 DIAGNOSIS — K21.9 GASTROESOPHAGEAL REFLUX DISEASE, UNSPECIFIED WHETHER ESOPHAGITIS PRESENT: ICD-10-CM

## 2023-05-09 LAB
EXPIRATION DATE: NORMAL
EXPIRATION DATE: NORMAL
FLUAV AG NPH QL: NEGATIVE
FLUBV AG NPH QL: NEGATIVE
INTERNAL CONTROL: NORMAL
INTERNAL CONTROL: NORMAL
Lab: NORMAL
Lab: NORMAL
SARS-COV-2 AG UPPER RESP QL IA.RAPID: NOT DETECTED

## 2023-05-09 PROCEDURE — 87804 INFLUENZA ASSAY W/OPTIC: CPT | Performed by: STUDENT IN AN ORGANIZED HEALTH CARE EDUCATION/TRAINING PROGRAM

## 2023-05-09 PROCEDURE — 87426 SARSCOV CORONAVIRUS AG IA: CPT | Performed by: STUDENT IN AN ORGANIZED HEALTH CARE EDUCATION/TRAINING PROGRAM

## 2023-05-09 PROCEDURE — 99213 OFFICE O/P EST LOW 20 MIN: CPT | Performed by: STUDENT IN AN ORGANIZED HEALTH CARE EDUCATION/TRAINING PROGRAM

## 2023-05-09 RX ORDER — PANTOPRAZOLE SODIUM 40 MG/1
40 TABLET, DELAYED RELEASE ORAL DAILY
Qty: 7 TABLET | Refills: 0 | Status: SHIPPED | OUTPATIENT
Start: 2023-05-09 | End: 2023-05-16

## 2023-05-09 RX ORDER — ONDANSETRON 4 MG/1
4 TABLET, FILM COATED ORAL EVERY 8 HOURS PRN
Qty: 5 TABLET | Refills: 0 | Status: SHIPPED | OUTPATIENT
Start: 2023-05-09

## 2023-05-09 RX ORDER — LOPERAMIDE HYDROCHLORIDE 2 MG/1
2 CAPSULE ORAL 4 TIMES DAILY PRN
Qty: 28 CAPSULE | Refills: 0 | Status: SHIPPED | OUTPATIENT
Start: 2023-05-09

## 2023-05-09 NOTE — TELEPHONE ENCOUNTER
Caller: Simi Royal    Relationship: Self    Best call back number: [phone]    What test was performed: FLU    When was the test performed: 05/09/23    Where was the test performed: IN OFFICE    Additional notes: PATIENT IS REQUESTING A CALL BACK TO DISCUSS THE RESULTS, TO DETERMINE IF SHE CAN BE AROUND ANYBODY OR NOT.     HUB ATTEMPTED WARM TRANSFER AND WAS UNSUCCESSFUL    PLEASE ADVISE.

## 2023-05-09 NOTE — PROGRESS NOTES
"Chief Complaint  Diarrhea (Nausea and fatigue)    Subjective        Simi Royal presents to [facility]  History of Present Illness  Symptoms such as loose stool ( multiple times ) started since Saturday.   Currently pt is experiencing lethargy, cough on and off, nauseous, dizzy, loose stool( since 6 Am morning 3 times bowel movements, loose stool)      Objective   Vital Signs:  /84   Pulse 80   Temp 97.3 °F (36.3 °C)   Resp 16   Ht 157.5 cm (62.01\")   Wt 77.3 kg (170 lb 6.4 oz)   SpO2 94%   BMI 31.16 kg/m²   Estimated body mass index is 31.16 kg/m² as calculated from the following:    Height as of this encounter: 157.5 cm (62.01\").    Weight as of this encounter: 77.3 kg (170 lb 6.4 oz).             Physical Exam  HENT:      Head: Normocephalic and atraumatic.      Mouth/Throat:      Mouth: Mucous membranes are moist.      Pharynx: Oropharynx is clear.   Eyes:      Extraocular Movements: Extraocular movements intact.      Conjunctiva/sclera: Conjunctivae normal.      Pupils: Pupils are equal, round, and reactive to light.   Cardiovascular:      Rate and Rhythm: Normal rate and regular rhythm.   Pulmonary:      Effort: Pulmonary effort is normal.      Breath sounds: Normal breath sounds.   Abdominal:      General: Bowel sounds are normal.      Palpations: Abdomen is soft.      Tenderness: There is abdominal tenderness. There is no right CVA tenderness, left CVA tenderness or guarding.      Comments: Mild tenderness in epigastric area   Musculoskeletal:         General: Normal range of motion.      Cervical back: Neck supple.   Skin:     General: Skin is warm.      Capillary Refill: Capillary refill takes less than 2 seconds.   Neurological:      General: No focal deficit present.      Mental Status: She is alert and oriented to person, place, and time. Mental status is at baseline.   Psychiatric:         Mood and Affect: Mood normal.        Result Review " :                   Assessment and Plan   Diagnoses and all orders for this visit:    1. Gastroenteritis (Primary)    2. Cough in adult  -     POCT SARS-CoV-2 Antigen DA  -     POCT Influenza A/B    3. Other fatigue  -     POCT SARS-CoV-2 Antigen DA  -     POCT Influenza A/B    4. Gastroesophageal reflux disease, unspecified whether esophagitis present  -     pantoprazole (PROTONIX) 40 MG EC tablet; Take 1 tablet by mouth Daily for 7 days. Empty stomach Half an hour before breakfast  Dispense: 7 tablet; Refill: 0    5. Nausea and vomiting, unspecified vomiting type  -     ondansetron (Zofran) 4 MG tablet; Take 1 tablet by mouth Every 8 (Eight) Hours As Needed for Nausea or Vomiting.  Dispense: 5 tablet; Refill: 0  -     POCT Influenza A/B    6. Diarrhea, unspecified type  -     loperamide (IMODIUM) 2 MG capsule; Take 1 capsule by mouth 4 (Four) Times a Day As Needed for Diarrhea.  Dispense: 28 capsule; Refill: 0  -     POCT Influenza A/B     most likely symptoms related to mild case of gastroenteritis, ad vise pt to take protonix, zofran as needed for nausea  Loperamide as needed if BM >4 in a day  RTC if symptoms worsen or persists         Follow Up   No follow-ups on file.  Patient was given instructions and counseling regarding her condition or for health maintenance advice. Please see specific information pulled into the AVS if appropriate.

## 2023-08-28 ENCOUNTER — TELEMEDICINE (OUTPATIENT)
Dept: FAMILY MEDICINE CLINIC | Facility: TELEHEALTH | Age: 53
End: 2023-08-28
Payer: COMMERCIAL

## 2023-08-28 DIAGNOSIS — B02.9 HERPES ZOSTER WITHOUT COMPLICATION: Primary | ICD-10-CM

## 2023-08-28 RX ORDER — VALACYCLOVIR HYDROCHLORIDE 1 G/1
1000 TABLET, FILM COATED ORAL 3 TIMES DAILY
Qty: 21 TABLET | Refills: 0 | Status: SHIPPED | OUTPATIENT
Start: 2023-08-28 | End: 2023-09-04

## 2023-08-28 RX ORDER — METHYLPREDNISOLONE 4 MG/1
TABLET ORAL
Qty: 1 EACH | Refills: 0 | Status: SHIPPED | OUTPATIENT
Start: 2023-08-28

## 2023-08-28 NOTE — PATIENT INSTRUCTIONS
Shingles    Shingles is an infection. It gives you a painful skin rash and blisters that have fluid in them. Shingles is caused by the same germ (virus) that causes chickenpox.  Shingles only happens in people who:  Have had chickenpox.  Have been given a shot (vaccine) to protect against chickenpox. Shingles is rare in this group.  What are the causes?  This condition is caused by varicella-zoster virus. This is the same germ that causes chickenpox. After a person is exposed to the germ, the germ stays in the body but is not active (dormant).  Shingles develops if the germ becomes active again (is reactivated). This can happen many years after the first exposure to the germ. It is not known what causes this germ to become active again.  What increases the risk?  People who have had chickenpox or received the chickenpox shot are at risk for shingles. This infection is more common in people who:  Are older than 60 years of age.  Have a weakened disease-fighting system (immune system), such as people with:  HIV (human immunodeficiency virus).  AIDS (acquired immunodeficiency syndrome).  Cancer.  Are taking medicines that weaken the immune system, such as organ transplant medicines.  Have a lot of stress.  What are the signs or symptoms?  The first symptoms of shingles may be itching, tingling, or pain in an area on your skin.  A rash will show on your skin a few days or weeks later. This is what usually happens:  The rash is likely to be on one side of your body.  The rash usually has a shape like a belt or a band. Over time, the rash turns into fluid-filled blisters.  The blisters will break open and change into scabs.  The scabs usually dry up in about 2-3 weeks.  You may also have:  A fever.  Chills.  A headache.  A feeling like you may vomit (nausea).  How is this treated?  The rash may last for several weeks. There is not a specific cure for this condition.  Your doctor may prescribe medicines. Medicines  may:  Help with pain.  Help you get better sooner.  Help to prevent long-term problems.  Help with itching (antihistamines).  If the area involved is on your face, you may need to see a specialist. This may be an eye doctor or an ear, nose, and throat (ENT) doctor.  Follow these instructions at home:  Medicines  Take over-the-counter and prescription medicines only as told by your doctor.  Put on an anti-itch cream or numbing cream where you have a rash, blisters, or scabs. Do this as told by your doctor.  Helping with itching and discomfort    Put cold, wet cloths (cold compresses) on the area of the rash or blisters as told by your doctor.  Cool baths can help you feel better. Try adding baking soda or dry oatmeal to the water to lessen itching. Do not bathe in hot water.  Use calamine lotion as told by your doctor.  Blister and rash care  Keep your rash covered with a loose bandage (dressing).  Wear loose clothing that does not rub on your rash.  Wash your hands with soap and water for at least 20 seconds before and after you change your bandage. If you cannot use soap and water, use hand .  Change your bandage as told by your doctor.  Keep your rash and blisters clean. To do this, wash the area with mild soap and cool water as told by your doctor.  Check your rash every day for signs of infection. Check for:  More redness, swelling, or pain.  Fluid or blood.  Warmth.  Pus or a bad smell.  Do not scratch your rash. Do not pick at your blisters. To help you to not scratch:  Keep your fingernails clean and cut short.  Wear gloves or mittens when you sleep, if scratching is a problem.  General instructions  Rest as told by your doctor.  Wash your hands often with soap and water for at least 20 seconds. If you cannot use soap and water, use hand . Doing this lowers your chance of getting a skin infection.  Your infection can cause chickenpox in people who have never had chickenpox or never got a  chickenpox vaccine shot. If you have blisters that did not change into scabs yet, try not to touch other people or be around other people, especially:  Babies.  Pregnant women.  Children who have areas of red, itchy, or rough skin (eczema).  Older people who have organ transplants.  People who have a long-term (chronic) illness, like cancer or AIDS.  Keep all follow-up visits.  How is this prevented?  A vaccine shot is the best way to prevent shingles and protect against shingles problems.  If you have not had a vaccine shot, talk with your doctor about getting it.  Where to find more information  Centers for Disease Control and Prevention: www.cdc.gov  Contact a doctor if:  Your pain does not get better with medicine.  Your pain does not get better after the rash heals.  You have any of these signs of infection around the rash:  More redness, swelling, or pain.  Fluid or blood.  Warmth.  Pus or a bad smell.  You have a fever.  Get help right away if:  The rash is on your face or nose.  You have pain in your face or pain by your eye.  You lose feeling on one side of your face.  You have trouble seeing.  You have ear pain, or you have ringing in your ear.  You have a loss of taste.  Your condition gets worse.  Summary  Shingles gives you a painful skin rash and blisters that have fluid in them.  Shingles is caused by the same germ (virus) that causes chickenpox.  Keep your rash covered with a loose bandage. Wear loose clothing that does not rub on your rash.  If you have blisters that did not change into scabs yet, try not to touch other people or be around people.  This information is not intended to replace advice given to you by your health care provider. Make sure you discuss any questions you have with your health care provider.  Document Revised: 12/13/2021 Document Reviewed: 12/13/2021  Elsevier Patient Education c 2023 [employer]

## 2023-08-28 NOTE — PROGRESS NOTES
"Chief Complaint   Patient presents with    Rash       Video Visit Reason:   Free Text Description: Shingles  Subjective   Simi Royal is a 53 y.o. female.     History of Present Illness  Shingles rash on back of thigh with pain down the back of the leg. She has had shingles before but not in this area. She has had the rash for several days. She had more discomfort over the last several days. She had some tingling of the scalp this weekend and it was separate from the shingles. She does not have a rash there.   Rash  Pertinent negatives include no congestion, cough, fever, shortness of breath or sore throat.     The following portions of the patient's history were reviewed and updated as appropriate: allergies, current medications, past medical history, and problem list.      Past Medical History:   Diagnosis Date    Anxiety 03/28/2018    Cancer     uterine cancer per recent biopsy     Depression 1/2018    History of anxiety      Social History     Socioeconomic History    Marital status:     Number of children: 3   Tobacco Use    Smoking status: Never    Smokeless tobacco: Never   Vaping Use    Vaping Use: Never used   Substance and Sexual Activity    Alcohol use: Yes     Alcohol/week: 2.0 standard drinks     Types: 2 Glasses of wine per week     Comment: occasionally -\"a couple a month\"    Drug use: No    Sexual activity: Not Currently     Partners: Male     Birth control/protection: None     medication documentation: reviewed and updated with patient and   Current Outpatient Medications:     loperamide (IMODIUM) 2 MG capsule, Take 1 capsule by mouth 4 (Four) Times a Day As Needed for Diarrhea., Disp: 28 capsule, Rfl: 0    methylPREDNISolone (MEDROL) 4 MG dose pack, Take as directed on package instructions., Disp: 1 each, Rfl: 0    ondansetron (Zofran) 4 MG tablet, Take 1 tablet by mouth Every 8 (Eight) Hours As Needed for Nausea or Vomiting., Disp: 5 tablet, Rfl: 0    valACYclovir (Valtrex) 1000 " MG tablet, Take 1 tablet by mouth 3 (Three) Times a Day for 7 days., Disp: 21 tablet, Rfl: 0    vitamin D (ERGOCALCIFEROL) 1.25 MG (63807 UT) capsule capsule, Take 1 capsule by mouth 1 (One) Time Per Week., Disp: 12 capsule, Rfl: 1  Review of Systems   Constitutional:  Negative for chills and fever.   HENT:  Negative for congestion, postnasal drip, sinus pressure and sore throat.    Respiratory:  Negative for cough, shortness of breath and wheezing.    Musculoskeletal:  Positive for arthralgias and myalgias.   Skin:  Positive for rash.     Objective   Physical Exam  Constitutional:       General: She is not in acute distress.     Appearance: She is not ill-appearing.   Pulmonary:      Effort: Pulmonary effort is normal.   Neurological:      Mental Status: She is alert.   Psychiatric:         Speech: Speech normal.       Assessment & Plan   Diagnoses and all orders for this visit:    1. Herpes zoster without complication (Primary)  -     valACYclovir (Valtrex) 1000 MG tablet; Take 1 tablet by mouth 3 (Three) Times a Day for 7 days.  Dispense: 21 tablet; Refill: 0  -     methylPREDNISolone (MEDROL) 4 MG dose pack; Take as directed on package instructions.  Dispense: 1 each; Refill: 0                    Follow Up:  If your symptoms are not resolving by the completion of your treatment or are worsening, see your primary care provider for follow up. If you don't have a primary care provider, you may go to any Urgent Care for re-evaluation. If you develop any life threatening symptoms, go to the nearest Emergency Department immediately or call EMS.               The use of  Video Visit was utilized during this visit, using both [employer] and ClearFlow/Epic. The use of a video visit has been reviewed with the patient and verbal informed consent has been obtained. No technical difficulties occurred during the visit.    is located at [address] [place] [address]  Provider is located at [place], [place]

## 2025-02-18 ENCOUNTER — PATIENT ROUNDING (BHMG ONLY) (OUTPATIENT)
Age: 55
End: 2025-02-18
Payer: COMMERCIAL

## 2025-02-18 NOTE — ED NOTES
Thank you for letting us care for you in your recent visit to our [facility] urgent care center. We would love to hear about your experience with us. Was this the first time you have visited our location?         We’re always looking for ways to make our patients’ experiences even better. Do you have any recommendations on ways we may improve?         I appreciate you taking the time to respond. Please be on the lookout for a survey about your recent visit from [facility] via text or email. We would greatly appreciate if you could fill that out and turn it back in. We want your voice to be heard and we value your feedback.    Thank you for choosing [facility] for your healthcare needs.         If you have concerns or would like to speak to me in person regarding your visit please feel free to give me a call. [phone]         Hope you get well soon and thank you.         Shey Edwards  Practice Manager

## 2025-04-23 ENCOUNTER — LAB (OUTPATIENT)
Dept: LAB | Facility: HOSPITAL | Age: 55
End: 2025-04-23
Payer: COMMERCIAL

## 2025-04-23 ENCOUNTER — TRANSCRIBE ORDERS (OUTPATIENT)
Dept: LAB | Facility: HOSPITAL | Age: 55
End: 2025-04-23
Payer: COMMERCIAL

## 2025-04-23 DIAGNOSIS — Z01.419 ROUTINE GYNECOLOGICAL EXAMINATION: Primary | ICD-10-CM

## 2025-04-23 DIAGNOSIS — Z01.419 ROUTINE GYNECOLOGICAL EXAMINATION: ICD-10-CM

## 2025-04-23 LAB
ESTRADIOL SERPL HS-MCNC: 15.6 PG/ML
FSH SERPL-ACNC: 99 MIU/ML
PROGEST SERPL-MCNC: 0.27 NG/ML
T4 FREE SERPL-MCNC: 1.13 NG/DL (ref 0.92–1.68)
TSH SERPL DL<=0.05 MIU/L-ACNC: 4.97 UIU/ML (ref 0.27–4.2)

## 2025-04-23 PROCEDURE — 84439 ASSAY OF FREE THYROXINE: CPT

## 2025-04-23 PROCEDURE — 83001 ASSAY OF GONADOTROPIN (FSH): CPT

## 2025-04-23 PROCEDURE — 84402 ASSAY OF FREE TESTOSTERONE: CPT

## 2025-04-23 PROCEDURE — 84403 ASSAY OF TOTAL TESTOSTERONE: CPT

## 2025-04-23 PROCEDURE — 82670 ASSAY OF TOTAL ESTRADIOL: CPT

## 2025-04-23 PROCEDURE — 84443 ASSAY THYROID STIM HORMONE: CPT

## 2025-04-23 PROCEDURE — 84144 ASSAY OF PROGESTERONE: CPT

## 2025-04-23 PROCEDURE — 36415 COLL VENOUS BLD VENIPUNCTURE: CPT

## 2025-04-26 LAB
TESTOST FREE SERPL-MCNC: 1 PG/ML (ref 0–4.2)
TESTOST SERPL-MCNC: 16 NG/DL (ref 4–50)

## (undated) DEVICE — GLV SURG DERMASSURE GRN LF PF SZ 6.5

## (undated) DEVICE — TBG VITL VUE XLNG

## (undated) DEVICE — ANTIBACTERIAL UNDYED BRAIDED (POLYGLACTIN 910), SYNTHETIC ABSORBABLE SURGICAL SUTURE: Brand: COATED VICRYL

## (undated) DEVICE — SOL LR 1000ML

## (undated) DEVICE — NDL SPINE 22G 31/2IN BLK

## (undated) DEVICE — TROCARS: Brand: KII® BALLOON BLUNT TIP SYSTEM

## (undated) DEVICE — GLV SURG SENSICARE MICRO PF LF 6 STRL

## (undated) DEVICE — CANNULA,OXY,ADULT,SUPERSOFT,W/7'TUB,UC: Brand: MEDLINE

## (undated) DEVICE — GLV SURG SENSICARE MICRO PF LF 7.5 STRL

## (undated) DEVICE — FLTR PLUMEPORT LAP W/CONN STRL

## (undated) DEVICE — ENSEAL 20 CM SHAFT, LARGE JAW: Brand: ENSEAL X1

## (undated) DEVICE — GLV SURG DERMASSURE GRN LF PF 7.0

## (undated) DEVICE — ANTIBACTERIAL UNDYED BRAIDED (POLYGLACTIN 910), SYNTHETIC ABSORBABLE SUTURE: Brand: COATED VICRYL

## (undated) DEVICE — ENDOPATH XCEL UNIVERSAL TROCAR STABLILITY SLEEVES: Brand: ENDOPATH XCEL

## (undated) DEVICE — SKIN AFFIX SURG ADHESIVE 72/CS 0.55ML: Brand: MEDLINE

## (undated) DEVICE — SUT VICYL PLS CTD ANTIB BR 1 27IN VIL

## (undated) DEVICE — SUT MNCRYL PLS ANTIB UD 3/0 PS2 27IN

## (undated) DEVICE — MEDI-VAC YANKAUER SUCTION HANDLE W/BULBOUS TIP: Brand: CARDINAL HEALTH

## (undated) DEVICE — PK LAP HYST 10

## (undated) DEVICE — 3M™ STERI-DRAPE™ INSTRUMENT POUCH 1018: Brand: STERI-DRAPE™

## (undated) DEVICE — ELECTRD BLD EXT EDGE 1P COAT 6.5IN STRL

## (undated) DEVICE — GLV SURG SENSICARE MICRO PF LF 7 STRL

## (undated) DEVICE — MEDI-VAC NON-CONDUCTIVE SUCTION TUBING: Brand: CARDINAL HEALTH

## (undated) DEVICE — ENDOPATH XCEL BLADELESS TROCARS WITH STABILITY SLEEVES: Brand: ENDOPATH XCEL

## (undated) DEVICE — HARMONIC ACE +7 LAPAROSCOPIC SHEARS ADVANCED HEMOSTASIS 5MM DIAMETER 36CM SHAFT LENGTH  FOR USE WITH GRAY HAND PIECE ONLY: Brand: HARMONIC ACE